# Patient Record
Sex: FEMALE | Race: WHITE | NOT HISPANIC OR LATINO | Employment: FULL TIME | URBAN - METROPOLITAN AREA
[De-identification: names, ages, dates, MRNs, and addresses within clinical notes are randomized per-mention and may not be internally consistent; named-entity substitution may affect disease eponyms.]

---

## 2018-01-18 PROCEDURE — 87255 GENET VIRUS ISOLATE HSV: CPT | Performed by: FAMILY MEDICINE

## 2018-01-22 ENCOUNTER — TELEPHONE (OUTPATIENT)
Dept: URGENT CARE | Facility: CLINIC | Age: 28
End: 2018-01-22

## 2018-01-22 NOTE — TELEPHONE ENCOUNTER
Notified Ms. Reyez of culture results, states she is feeling better, advised if symptoms return follow up with PCP, voiced understanding.

## 2018-08-23 ENCOUNTER — HOSPITAL ENCOUNTER (OUTPATIENT)
Facility: HOSPITAL | Age: 28
Discharge: HOME | End: 2018-08-23
Attending: OBSTETRICS & GYNECOLOGY | Admitting: OBSTETRICS & GYNECOLOGY

## 2018-08-23 VITALS
WEIGHT: 217 LBS | BODY MASS INDEX: 42.6 KG/M2 | SYSTOLIC BLOOD PRESSURE: 130 MMHG | TEMPERATURE: 98.4 F | RESPIRATION RATE: 18 BRPM | HEIGHT: 60 IN | HEART RATE: 92 BPM | DIASTOLIC BLOOD PRESSURE: 77 MMHG

## 2018-08-23 PROBLEM — O26.893 PREGNANCY HEADACHE, ANTEPARTUM, THIRD TRIMESTER: Status: ACTIVE | Noted: 2018-08-23

## 2018-08-23 PROBLEM — R51.9 PREGNANCY HEADACHE, ANTEPARTUM, THIRD TRIMESTER: Status: ACTIVE | Noted: 2018-08-23

## 2018-08-23 LAB
ALP SERPL-CCNC: 100 U/L (ref 25–100)
ALT SERPL W P-5'-P-CCNC: 24 U/L (ref 7–40)
AST SERPL-CCNC: 25 U/L (ref 0–33)
BACTERIA UR QL AUTO: ABNORMAL /HPF
BILIRUB SERPL-MCNC: 0.3 MG/DL (ref 0.3–1.2)
BILIRUB UR QL STRIP: NEGATIVE
CLARITY UR: ABNORMAL
COD CRY URNS QL: ABNORMAL /HPF
COLOR UR: YELLOW
CREAT BLD-MCNC: 0.65 MG/DL (ref 0.6–1.3)
DEPRECATED RDW RBC AUTO: 41.1 FL (ref 37–54)
ERYTHROCYTE [DISTWIDTH] IN BLOOD BY AUTOMATED COUNT: 13.7 % (ref 11.3–14.5)
GLUCOSE UR STRIP-MCNC: NEGATIVE MG/DL
HCT VFR BLD AUTO: 36.9 % (ref 34.5–44)
HGB BLD-MCNC: 11.9 G/DL (ref 11.5–15.5)
HGB UR QL STRIP.AUTO: NEGATIVE
HYALINE CASTS UR QL AUTO: ABNORMAL /LPF
KETONES UR QL STRIP: NEGATIVE
LDH SERPL-CCNC: 172 U/L (ref 120–246)
LEUKOCYTE ESTERASE UR QL STRIP.AUTO: NEGATIVE
MCH RBC QN AUTO: 26.5 PG (ref 27–31)
MCHC RBC AUTO-ENTMCNC: 32.2 G/DL (ref 32–36)
MCV RBC AUTO: 82.2 FL (ref 80–99)
NITRITE UR QL STRIP: NEGATIVE
PH UR STRIP.AUTO: 6 [PH] (ref 5–8)
PLATELET # BLD AUTO: 264 10*3/MM3 (ref 150–450)
PMV BLD AUTO: 11.3 FL (ref 6–12)
PROT UR QL STRIP: ABNORMAL
RBC # BLD AUTO: 4.49 10*6/MM3 (ref 3.89–5.14)
RBC # UR: ABNORMAL /HPF
REF LAB TEST METHOD: ABNORMAL
SP GR UR STRIP: 1.02 (ref 1–1.03)
SQUAMOUS #/AREA URNS HPF: ABNORMAL /HPF
URATE SERPL-MCNC: 5.2 MG/DL (ref 3.1–7.8)
UROBILINOGEN UR QL STRIP: ABNORMAL
WBC NRBC COR # BLD: 9.48 10*3/MM3 (ref 3.5–10.8)
WBC UR QL AUTO: ABNORMAL /HPF

## 2018-08-23 PROCEDURE — 59025 FETAL NON-STRESS TEST: CPT

## 2018-08-23 PROCEDURE — 99218 PR INITIAL OBSERVATION CARE/DAY 30 MINUTES: CPT | Performed by: OBSTETRICS & GYNECOLOGY

## 2018-08-23 PROCEDURE — G0378 HOSPITAL OBSERVATION PER HR: HCPCS

## 2018-08-23 PROCEDURE — G0463 HOSPITAL OUTPT CLINIC VISIT: HCPCS

## 2018-08-23 PROCEDURE — 84460 ALANINE AMINO (ALT) (SGPT): CPT | Performed by: OBSTETRICS & GYNECOLOGY

## 2018-08-23 PROCEDURE — 82565 ASSAY OF CREATININE: CPT | Performed by: OBSTETRICS & GYNECOLOGY

## 2018-08-23 PROCEDURE — 59025 FETAL NON-STRESS TEST: CPT | Performed by: OBSTETRICS & GYNECOLOGY

## 2018-08-23 PROCEDURE — 85027 COMPLETE CBC AUTOMATED: CPT | Performed by: OBSTETRICS & GYNECOLOGY

## 2018-08-23 PROCEDURE — 81001 URINALYSIS AUTO W/SCOPE: CPT | Performed by: OBSTETRICS & GYNECOLOGY

## 2018-08-23 PROCEDURE — 84550 ASSAY OF BLOOD/URIC ACID: CPT | Performed by: OBSTETRICS & GYNECOLOGY

## 2018-08-23 PROCEDURE — 83615 LACTATE (LD) (LDH) ENZYME: CPT | Performed by: OBSTETRICS & GYNECOLOGY

## 2018-08-23 PROCEDURE — 87086 URINE CULTURE/COLONY COUNT: CPT | Performed by: OBSTETRICS & GYNECOLOGY

## 2018-08-23 PROCEDURE — 84075 ASSAY ALKALINE PHOSPHATASE: CPT | Performed by: OBSTETRICS & GYNECOLOGY

## 2018-08-23 PROCEDURE — 82247 BILIRUBIN TOTAL: CPT | Performed by: OBSTETRICS & GYNECOLOGY

## 2018-08-23 PROCEDURE — 84450 TRANSFERASE (AST) (SGOT): CPT | Performed by: OBSTETRICS & GYNECOLOGY

## 2018-08-23 RX ORDER — RANITIDINE 150 MG/1
150 TABLET ORAL DAILY
COMMUNITY
End: 2019-05-13

## 2018-08-23 RX ORDER — PRENATAL WITH FERROUS FUM AND FOLIC ACID 3080; 920; 120; 400; 22; 1.84; 3; 20; 10; 1; 12; 200; 27; 25; 2 [IU]/1; [IU]/1; MG/1; [IU]/1; MG/1; MG/1; MG/1; MG/1; MG/1; MG/1; UG/1; MG/1; MG/1; MG/1; MG/1
1 TABLET ORAL DAILY
COMMUNITY
End: 2019-05-13

## 2018-08-23 RX ORDER — ACETAMINOPHEN 500 MG
1000 TABLET ORAL ONCE
Status: COMPLETED | OUTPATIENT
Start: 2018-08-23 | End: 2018-08-23

## 2018-08-23 RX ADMIN — ACETAMINOPHEN 1000 MG: 500 TABLET, FILM COATED ORAL at 20:46

## 2018-08-24 NOTE — H&P
"Bibi Michaelland  1990  [id number]  [id number]    CC: feet swelling  HPI:  Patient is 27 y.o. white female   currently at 31w3d  Presents with c/o swelling in both feet.  Pt returned from 4 day trip at [employer] on Monday.  Since then pt has had bilat pedal edema, worse at night.  Pt also with c/o frontal HA the past 2 nights, rates 4/10, occurred in evenings only, non-radiating, denies assoc V.  Some N (pt feels prob assoc with worsening GERD).  Good FM.  PNC comp by prev , hx preeclampsia last preg, obesity    PMH:  Current meds PNV, zantac 150mg qd  Illnesses none  Surgeries , lap emma, T and A, oral surg  Allergies NKDA    Past OB History:       Obstetric History       T0      L1     SAB0   TAB0   Ectopic0   Molar0   Multiple0   Live Births1       # Outcome Date GA Lbr Kimo/2nd Weight Sex Delivery Anes PTL Lv   2 Current            1  14 36w6d  2495 g (5 lb 8 oz) F CS-LTranv   VIRIDIANA      Complications: Failure to Progress in First Stage,Pre-eclampsia               SH: tob neg , EtOH neg, drugs neg  FH: heart dz pos , diabetes pos , cancer pos    General ROS: HA, N, edema.   All other systems reviewed and are negative.      Physical Examination: General appearance - alert, well appearing, and in no distress  Vital signs - /71   Pulse 93   Temp 98.4 °F (36.9 °C) (Oral)   Resp 18   Ht 152.4 cm (60\")   Wt 98.4 kg (217 lb)   LMP 01/15/2018 (Exact Date)   BMI 42.38 kg/m²   HEENT: normocephalic, atraumatic,oropharynx clear, appearance of ears and nose normal  Neck - supple, no significant adenopathy, no thyromegaly  Lymphatics - no palpable lymphadenopathy in the neck or groin, no hepatosplenomegaly  Chest - clear to auscultation, no wheezes, rales or rhonchi, respiratory effort non-labored  Heart - normal rate, regular rhythm, normal S1, S2, no murmurs, rubs, clicks or gallops, no JVD, 1+ lower extremity edema  Abdomen - soft, nontender, " nondistended, no masses, no hepatosplenomegaly  no rebound tenderness noted, bowel sounds normal  Vaginal Exam: deferred  etremities - 1+ pitting pedal edema noted, no calf tend  Skin -warm and dry, normal coloration and turgor, no rashes, no suspicious skin lesions noted        Fetal monitoring: indication swelling, headache , onset  , offset  , baseline 155 , mod BTB variability , multiple accels (15 X 15), no decels, rare contractions, interpretation reactive NST    Radiology     Assessment 1)IUP 31 3/7 weeks     2)headache- prob benign     3)pedal edema- all BP's normal, PEP normal    4)prev     5)hx preeclampsia     6)obesity    Plan 1)observe     2)home      3)keep next sched appt    4)Tyl for SINHA    Aniket Pulido MD  2018  8:36 PM

## 2018-08-24 NOTE — NURSING NOTE
REVIEWED DISCHARGE INSTRUCTIONS WITH PT. PT VU. PT INSTRUCTED TO KEEP REGULAR APPOINTMENT AND TO CALL OFFICE OR COME BACK TO HOSPITAL IF LEAKING, BLEEDING, HEADACHE WORSENS, FETUS NOT MOVING. PT VU.   PT ABMULATES OUT.

## 2018-08-25 LAB — BACTERIA SPEC AEROBE CULT: NORMAL

## 2018-08-29 ENCOUNTER — TRANSCRIBE ORDERS (OUTPATIENT)
Dept: DIABETES SERVICES | Facility: HOSPITAL | Age: 28
End: 2018-08-29

## 2018-08-29 DIAGNOSIS — O24.410 DIET CONTROLLED GESTATIONAL DIABETES MELLITUS (GDM), ANTEPARTUM: Primary | ICD-10-CM

## 2018-08-30 ENCOUNTER — HOSPITAL ENCOUNTER (OUTPATIENT)
Dept: DIABETES SERVICES | Facility: HOSPITAL | Age: 28
Setting detail: RECURRING SERIES
Discharge: HOME | End: 2018-08-30

## 2018-08-30 PROCEDURE — G0109 DIAB MANAGE TRN IND/GROUP: HCPCS | Performed by: DIETITIAN, REGISTERED

## 2018-09-11 ENCOUNTER — HOSPITAL ENCOUNTER (OUTPATIENT)
Dept: DIABETES SERVICES | Facility: HOSPITAL | Age: 28
Setting detail: RECURRING SERIES
Discharge: HOME | End: 2018-09-11

## 2018-09-21 ENCOUNTER — LAB (OUTPATIENT)
Dept: LAB | Facility: HOSPITAL | Age: 28
End: 2018-09-21

## 2018-09-21 ENCOUNTER — HOSPITAL ENCOUNTER (INPATIENT)
Facility: HOSPITAL | Age: 28
LOS: 6 days | Discharge: HOME | End: 2018-09-27
Attending: OBSTETRICS & GYNECOLOGY | Admitting: OBSTETRICS & GYNECOLOGY

## 2018-09-21 ENCOUNTER — LAB REQUISITION (OUTPATIENT)
Dept: LAB | Facility: HOSPITAL | Age: 28
End: 2018-09-21

## 2018-09-21 ENCOUNTER — TRANSCRIBE ORDERS (OUTPATIENT)
Dept: LAB | Facility: HOSPITAL | Age: 28
End: 2018-09-21

## 2018-09-21 DIAGNOSIS — Z34.83 PRENATAL CARE, SUBSEQUENT PREGNANCY, THIRD TRIMESTER: ICD-10-CM

## 2018-09-21 DIAGNOSIS — Z00.00 ROUTINE GENERAL MEDICAL EXAMINATION AT A HEALTH CARE FACILITY: ICD-10-CM

## 2018-09-21 DIAGNOSIS — Z34.83 PRENATAL CARE, SUBSEQUENT PREGNANCY, THIRD TRIMESTER: Primary | ICD-10-CM

## 2018-09-21 PROBLEM — O16.3 HYPERTENSION AFFECTING PREGNANCY IN THIRD TRIMESTER: Status: ACTIVE | Noted: 2018-09-21

## 2018-09-21 LAB
ALP SERPL-CCNC: 158 U/L (ref 25–100)
ALP SERPL-CCNC: 164 U/L (ref 25–100)
ALT SERPL W P-5'-P-CCNC: 15 U/L (ref 7–40)
ALT SERPL W P-5'-P-CCNC: 20 U/L (ref 7–40)
AST SERPL-CCNC: 17 U/L (ref 0–33)
AST SERPL-CCNC: 18 U/L (ref 0–33)
BASOPHILS # BLD AUTO: 0.01 10*3/MM3 (ref 0–0.2)
BASOPHILS NFR BLD AUTO: 0.1 % (ref 0–1)
BILIRUB SERPL-MCNC: 0.3 MG/DL (ref 0.3–1.2)
BILIRUB SERPL-MCNC: 0.3 MG/DL (ref 0.3–1.2)
CREAT BLD-MCNC: 0.7 MG/DL (ref 0.6–1.3)
CREAT BLD-MCNC: 0.82 MG/DL (ref 0.6–1.3)
CREAT UR-MCNC: 289.6 MG/DL
DEPRECATED RDW RBC AUTO: 38.3 FL (ref 37–54)
DEPRECATED RDW RBC AUTO: 39.6 FL (ref 37–54)
EOSINOPHIL # BLD AUTO: 0.05 10*3/MM3 (ref 0–0.3)
EOSINOPHIL NFR BLD AUTO: 0.5 % (ref 0–3)
ERYTHROCYTE [DISTWIDTH] IN BLOOD BY AUTOMATED COUNT: 13.5 % (ref 11.3–14.5)
ERYTHROCYTE [DISTWIDTH] IN BLOOD BY AUTOMATED COUNT: 13.7 % (ref 11.3–14.5)
GFR SERPL CREATININE-BSD FRML MDRD: 101 ML/MIN/1.73
HCT VFR BLD AUTO: 36.6 % (ref 34.5–44)
HCT VFR BLD AUTO: 37.7 % (ref 34.5–44)
HGB BLD-MCNC: 11.6 G/DL (ref 11.5–15.5)
HGB BLD-MCNC: 12.2 G/DL (ref 11.5–15.5)
IMM GRANULOCYTES # BLD: 0.02 10*3/MM3 (ref 0–0.03)
IMM GRANULOCYTES NFR BLD: 0.2 % (ref 0–0.6)
LDH SERPL-CCNC: 189 U/L (ref 120–246)
LDH SERPL-CCNC: 197 U/L (ref 120–246)
LYMPHOCYTES # BLD AUTO: 1.87 10*3/MM3 (ref 0.6–4.8)
LYMPHOCYTES NFR BLD AUTO: 18.5 % (ref 24–44)
MCH RBC QN AUTO: 25.3 PG (ref 27–31)
MCH RBC QN AUTO: 25.3 PG (ref 27–31)
MCHC RBC AUTO-ENTMCNC: 31.7 G/DL (ref 32–36)
MCHC RBC AUTO-ENTMCNC: 32.4 G/DL (ref 32–36)
MCV RBC AUTO: 78.2 FL (ref 80–99)
MCV RBC AUTO: 79.9 FL (ref 80–99)
MONOCYTES # BLD AUTO: 0.8 10*3/MM3 (ref 0–1)
MONOCYTES NFR BLD AUTO: 7.9 % (ref 0–12)
NEUTROPHILS # BLD AUTO: 7.37 10*3/MM3 (ref 1.5–8.3)
NEUTROPHILS NFR BLD AUTO: 73 % (ref 41–71)
PLATELET # BLD AUTO: 248 10*3/MM3 (ref 150–450)
PLATELET # BLD AUTO: 259 10*3/MM3 (ref 150–450)
PMV BLD AUTO: 12.8 FL (ref 6–12)
PMV BLD AUTO: 13.1 FL (ref 6–12)
PROT UR-MCNC: 116 MG/DL (ref 1–14)
RBC # BLD AUTO: 4.58 10*6/MM3 (ref 3.89–5.14)
RBC # BLD AUTO: 4.82 10*6/MM3 (ref 3.89–5.14)
URATE SERPL-MCNC: 6.8 MG/DL (ref 3.1–7.8)
URATE SERPL-MCNC: 7 MG/DL (ref 3.1–7.8)
WBC NRBC COR # BLD: 10.1 10*3/MM3 (ref 3.5–10.8)
WBC NRBC COR # BLD: 9.81 10*3/MM3 (ref 3.5–10.8)

## 2018-09-21 PROCEDURE — 86870 RBC ANTIBODY IDENTIFICATION: CPT | Performed by: OBSTETRICS & GYNECOLOGY

## 2018-09-21 PROCEDURE — 85027 COMPLETE CBC AUTOMATED: CPT | Performed by: OBSTETRICS & GYNECOLOGY

## 2018-09-21 PROCEDURE — 85025 COMPLETE CBC W/AUTO DIFF WBC: CPT | Performed by: OBSTETRICS & GYNECOLOGY

## 2018-09-21 PROCEDURE — 59025 FETAL NON-STRESS TEST: CPT

## 2018-09-21 PROCEDURE — 84550 ASSAY OF BLOOD/URIC ACID: CPT | Performed by: OBSTETRICS & GYNECOLOGY

## 2018-09-21 PROCEDURE — 83615 LACTATE (LD) (LDH) ENZYME: CPT | Performed by: OBSTETRICS & GYNECOLOGY

## 2018-09-21 PROCEDURE — 82570 ASSAY OF URINE CREATININE: CPT | Performed by: OBSTETRICS & GYNECOLOGY

## 2018-09-21 PROCEDURE — 84156 ASSAY OF PROTEIN URINE: CPT | Performed by: OBSTETRICS & GYNECOLOGY

## 2018-09-21 PROCEDURE — 84450 TRANSFERASE (AST) (SGOT): CPT | Performed by: OBSTETRICS & GYNECOLOGY

## 2018-09-21 PROCEDURE — 86900 BLOOD TYPING SEROLOGIC ABO: CPT | Performed by: OBSTETRICS & GYNECOLOGY

## 2018-09-21 PROCEDURE — 84075 ASSAY ALKALINE PHOSPHATASE: CPT | Performed by: OBSTETRICS & GYNECOLOGY

## 2018-09-21 PROCEDURE — 82247 BILIRUBIN TOTAL: CPT | Performed by: OBSTETRICS & GYNECOLOGY

## 2018-09-21 PROCEDURE — 82565 ASSAY OF CREATININE: CPT | Performed by: OBSTETRICS & GYNECOLOGY

## 2018-09-21 PROCEDURE — 86901 BLOOD TYPING SEROLOGIC RH(D): CPT | Performed by: OBSTETRICS & GYNECOLOGY

## 2018-09-21 PROCEDURE — 84460 ALANINE AMINO (ALT) (SGPT): CPT | Performed by: OBSTETRICS & GYNECOLOGY

## 2018-09-21 PROCEDURE — 82627 DEHYDROEPIANDROSTERONE: CPT | Performed by: OBSTETRICS & GYNECOLOGY

## 2018-09-21 PROCEDURE — 87081 CULTURE SCREEN ONLY: CPT

## 2018-09-21 PROCEDURE — 86850 RBC ANTIBODY SCREEN: CPT | Performed by: OBSTETRICS & GYNECOLOGY

## 2018-09-21 RX ORDER — SODIUM CHLORIDE 0.9 % (FLUSH) 0.9 %
1-10 SYRINGE (ML) INJECTION AS NEEDED
Status: DISCONTINUED | OUTPATIENT
Start: 2018-09-21 | End: 2018-09-27 | Stop reason: HOSPADM

## 2018-09-21 RX ORDER — LIDOCAINE HYDROCHLORIDE 10 MG/ML
5 INJECTION, SOLUTION EPIDURAL; INFILTRATION; INTRACAUDAL; PERINEURAL AS NEEDED
Status: DISCONTINUED | OUTPATIENT
Start: 2018-09-21 | End: 2018-09-27 | Stop reason: HOSPADM

## 2018-09-21 RX ORDER — ACETAMINOPHEN 500 MG
1000 TABLET ORAL EVERY 4 HOURS PRN
Status: DISCONTINUED | OUTPATIENT
Start: 2018-09-21 | End: 2018-09-27 | Stop reason: HOSPADM

## 2018-09-21 RX ADMIN — ACETAMINOPHEN 1000 MG: 500 TABLET, FILM COATED ORAL at 23:18

## 2018-09-22 LAB
ABO GROUP BLD: NORMAL
ANTI-D, PASSIVE: NORMAL
BLD GP AB SCN SERPL QL: POSITIVE
DHEA-S SERPL-MCNC: 190.6 UG/DL (ref 84.8–378)
GLUCOSE BLDC GLUCOMTR-MCNC: 73 MG/DL (ref 70–130)
RH BLD: NEGATIVE
T&S EXPIRATION DATE: NORMAL

## 2018-09-22 PROCEDURE — 82962 GLUCOSE BLOOD TEST: CPT

## 2018-09-22 PROCEDURE — 59025 FETAL NON-STRESS TEST: CPT

## 2018-09-22 RX ORDER — FAMOTIDINE 20 MG/1
20 TABLET, FILM COATED ORAL 2 TIMES DAILY
Status: DISCONTINUED | OUTPATIENT
Start: 2018-09-22 | End: 2018-09-22

## 2018-09-22 RX ORDER — TRISODIUM CITRATE DIHYDRATE AND CITRIC ACID MONOHYDRATE 500; 334 MG/5ML; MG/5ML
30 SOLUTION ORAL ONCE
Status: COMPLETED | OUTPATIENT
Start: 2018-09-22 | End: 2018-09-23

## 2018-09-22 RX ORDER — FAMOTIDINE 20 MG/1
20 TABLET, FILM COATED ORAL 2 TIMES DAILY
Status: DISCONTINUED | OUTPATIENT
Start: 2018-09-22 | End: 2018-09-27 | Stop reason: HOSPADM

## 2018-09-22 RX ADMIN — FAMOTIDINE 20 MG: 20 TABLET, FILM COATED ORAL at 20:13

## 2018-09-22 RX ADMIN — ACETAMINOPHEN 1000 MG: 500 TABLET, FILM COATED ORAL at 21:36

## 2018-09-23 ENCOUNTER — ANESTHESIA (OUTPATIENT)
Dept: LABOR AND DELIVERY | Facility: HOSPITAL | Age: 28
End: 2018-09-23

## 2018-09-23 ENCOUNTER — ANESTHESIA EVENT (OUTPATIENT)
Dept: LABOR AND DELIVERY | Facility: HOSPITAL | Age: 28
End: 2018-09-23

## 2018-09-23 LAB
ALP SERPL-CCNC: 157 U/L (ref 25–100)
ALT SERPL W P-5'-P-CCNC: 13 U/L (ref 7–40)
AST SERPL-CCNC: 16 U/L (ref 0–33)
ATMOSPHERIC PRESS: ABNORMAL MMHG
ATMOSPHERIC PRESS: ABNORMAL MMHG
BACTERIA UR QL AUTO: ABNORMAL /HPF
BASE EXCESS BLDCOA CALC-SCNC: -3.8 MMOL/L (ref 0–2)
BASE EXCESS BLDCOV CALC-SCNC: -4 MMOL/L (ref 0–2)
BDY SITE: ABNORMAL
BILIRUB SERPL-MCNC: 0.3 MG/DL (ref 0.3–1.2)
BILIRUB UR QL STRIP: NEGATIVE
CLARITY UR: ABNORMAL
CO2 BLDA-SCNC: 23.2 MMOL/L (ref 22–33)
CO2 BLDA-SCNC: 24.2 MMOL/L (ref 22–33)
COD CRY URNS QL: ABNORMAL /HPF
COLLECT DURATION TIME UR: 24 HRS
COLLECT DURATION TIME UR: 24 HRS
COLOR UR: ABNORMAL
CREAT BLD-MCNC: 0.75 MG/DL (ref 0.6–1.3)
CREAT UR-MCNC: 343 MG/DL
CREATINE 24H UR-MRATE: 1.37 G/24 HR (ref 0.6–1.8)
DEPRECATED RDW RBC AUTO: 39.7 FL (ref 37–54)
ERYTHROCYTE [DISTWIDTH] IN BLOOD BY AUTOMATED COUNT: 13.8 % (ref 11.3–14.5)
GLUCOSE UR STRIP-MCNC: NEGATIVE MG/DL
HCO3 BLDCOA-SCNC: 22.7 MMOL/L (ref 16.9–20.5)
HCO3 BLDCOV-SCNC: 21.9 MMOL/L (ref 18.6–21.4)
HCT VFR BLD AUTO: 36.6 % (ref 34.5–44)
HGB BLD-MCNC: 11.4 G/DL (ref 11.5–15.5)
HGB BLDA-MCNC: 13.8 G/DL (ref 14–18)
HGB BLDA-MCNC: 14.5 G/DL (ref 14–18)
HGB UR QL STRIP.AUTO: NEGATIVE
HOROWITZ INDEX BLD+IHG-RTO: 21 %
HOROWITZ INDEX BLD+IHG-RTO: 21 %
HYALINE CASTS UR QL AUTO: ABNORMAL /LPF
KETONES UR QL STRIP: NEGATIVE
LDH SERPL-CCNC: 183 U/L (ref 120–246)
LEUKOCYTE ESTERASE UR QL STRIP.AUTO: NEGATIVE
MCH RBC QN AUTO: 24.8 PG (ref 27–31)
MCHC RBC AUTO-ENTMCNC: 31.1 G/DL (ref 32–36)
MCV RBC AUTO: 79.6 FL (ref 80–99)
MODALITY: ABNORMAL
MODALITY: ABNORMAL
MUCOUS THREADS URNS QL MICRO: ABNORMAL /HPF
NITRITE UR QL STRIP: NEGATIVE
PCO2 BLDCOA: 45.8 MMHG (ref 43.3–54.9)
PCO2 BLDCOV: 41.9 MM HG (ref 30–60)
PH BLDCOA: 7.3 PH UNITS (ref 7.2–7.3)
PH BLDCOV: 7.33 PH UNITS (ref 7.19–7.46)
PH UR STRIP.AUTO: 5.5 [PH] (ref 5–8)
PLATELET # BLD AUTO: 246 10*3/MM3 (ref 150–450)
PMV BLD AUTO: 12.6 FL (ref 6–12)
PO2 BLDCOA: 14.1 MMHG (ref 30–43.3)
PO2 BLDCOV: 15.9 MM HG (ref 30–60)
PROT 24H UR-MRATE: 336 MG/24HOURS (ref 42–225)
PROT UR QL STRIP: ABNORMAL
PROT UR-MCNC: 84 MG/DL (ref 1–14)
RBC # BLD AUTO: 4.6 10*6/MM3 (ref 3.89–5.14)
RBC # UR: ABNORMAL /HPF
REF LAB TEST METHOD: ABNORMAL
SAO2 % BLDCOA: 22.5 %
SAO2 % BLDCOA: ABNORMAL % (ref 45–75)
SAO2 % BLDCOV: 28.6 %
SP GR UR STRIP: 1.04 (ref 1–1.03)
SPECIMEN VOL 24H UR: 400 ML
SPECIMEN VOL 24H UR: 400 ML
SQUAMOUS #/AREA URNS HPF: ABNORMAL /HPF
URATE SERPL-MCNC: 7.3 MG/DL (ref 3.1–7.8)
UROBILINOGEN UR QL STRIP: ABNORMAL
WBC NRBC COR # BLD: 9.03 10*3/MM3 (ref 3.5–10.8)
WBC UR QL AUTO: ABNORMAL /HPF

## 2018-09-23 PROCEDURE — 84156 ASSAY OF PROTEIN URINE: CPT | Performed by: OBSTETRICS & GYNECOLOGY

## 2018-09-23 PROCEDURE — 25010000002 MAGNESIUM SULFATE-LACT RINGERS 40 GM/580ML SOLUTION: Performed by: OBSTETRICS & GYNECOLOGY

## 2018-09-23 PROCEDURE — 84460 ALANINE AMINO (ALT) (SGPT): CPT | Performed by: OBSTETRICS & GYNECOLOGY

## 2018-09-23 PROCEDURE — 25010000002 PROMETHAZINE PER 50 MG: Performed by: OBSTETRICS & GYNECOLOGY

## 2018-09-23 PROCEDURE — 25010000002 BETAMETHASONE ACET & SOD PHOS PER 4 MG: Performed by: OBSTETRICS & GYNECOLOGY

## 2018-09-23 PROCEDURE — 82565 ASSAY OF CREATININE: CPT | Performed by: OBSTETRICS & GYNECOLOGY

## 2018-09-23 PROCEDURE — 83615 LACTATE (LD) (LDH) ENZYME: CPT | Performed by: OBSTETRICS & GYNECOLOGY

## 2018-09-23 PROCEDURE — 84075 ASSAY ALKALINE PHOSPHATASE: CPT | Performed by: OBSTETRICS & GYNECOLOGY

## 2018-09-23 PROCEDURE — 51703 INSERT BLADDER CATH COMPLEX: CPT

## 2018-09-23 PROCEDURE — 25010000002 ONDANSETRON PER 1 MG: Performed by: ANESTHESIOLOGY

## 2018-09-23 PROCEDURE — 81001 URINALYSIS AUTO W/SCOPE: CPT | Performed by: OBSTETRICS & GYNECOLOGY

## 2018-09-23 PROCEDURE — 84550 ASSAY OF BLOOD/URIC ACID: CPT | Performed by: OBSTETRICS & GYNECOLOGY

## 2018-09-23 PROCEDURE — 82570 ASSAY OF URINE CREATININE: CPT | Performed by: OBSTETRICS & GYNECOLOGY

## 2018-09-23 PROCEDURE — 84450 TRANSFERASE (AST) (SGOT): CPT | Performed by: OBSTETRICS & GYNECOLOGY

## 2018-09-23 PROCEDURE — 25010000002 METOCLOPRAMIDE PER 10 MG: Performed by: ANESTHESIOLOGY

## 2018-09-23 PROCEDURE — 25010000003 CEFAZOLIN IN DEXTROSE 2-4 GM/100ML-% SOLUTION: Performed by: OBSTETRICS & GYNECOLOGY

## 2018-09-23 PROCEDURE — 59514 CESAREAN DELIVERY ONLY: CPT | Performed by: OBSTETRICS & GYNECOLOGY

## 2018-09-23 PROCEDURE — 25010000002 FENTANYL CITRATE (PF) 100 MCG/2ML SOLUTION: Performed by: ANESTHESIOLOGY

## 2018-09-23 PROCEDURE — 25010000003 MORPHINE PER 10 MG: Performed by: ANESTHESIOLOGY

## 2018-09-23 PROCEDURE — 85027 COMPLETE CBC AUTOMATED: CPT | Performed by: OBSTETRICS & GYNECOLOGY

## 2018-09-23 PROCEDURE — 81050 URINALYSIS VOLUME MEASURE: CPT | Performed by: OBSTETRICS & GYNECOLOGY

## 2018-09-23 PROCEDURE — 25010000002 MIDAZOLAM PER 1 MG: Performed by: ANESTHESIOLOGY

## 2018-09-23 PROCEDURE — 82805 BLOOD GASES W/O2 SATURATION: CPT | Performed by: OBSTETRICS & GYNECOLOGY

## 2018-09-23 PROCEDURE — 87086 URINE CULTURE/COLONY COUNT: CPT | Performed by: OBSTETRICS & GYNECOLOGY

## 2018-09-23 PROCEDURE — 59025 FETAL NON-STRESS TEST: CPT

## 2018-09-23 PROCEDURE — 82247 BILIRUBIN TOTAL: CPT | Performed by: OBSTETRICS & GYNECOLOGY

## 2018-09-23 RX ORDER — BUTALBITAL, ACETAMINOPHEN AND CAFFEINE 50; 325; 40 MG/1; MG/1; MG/1
2 TABLET ORAL EVERY 4 HOURS PRN
Status: DISCONTINUED | OUTPATIENT
Start: 2018-09-23 | End: 2018-09-27 | Stop reason: HOSPADM

## 2018-09-23 RX ORDER — SODIUM CHLORIDE, SODIUM LACTATE, POTASSIUM CHLORIDE, CALCIUM CHLORIDE 600; 310; 30; 20 MG/100ML; MG/100ML; MG/100ML; MG/100ML
125 INJECTION, SOLUTION INTRAVENOUS CONTINUOUS
Status: DISCONTINUED | OUTPATIENT
Start: 2018-09-23 | End: 2018-09-24

## 2018-09-23 RX ORDER — MIDAZOLAM HYDROCHLORIDE 1 MG/ML
INJECTION INTRAMUSCULAR; INTRAVENOUS AS NEEDED
Status: DISCONTINUED | OUTPATIENT
Start: 2018-09-23 | End: 2018-09-23 | Stop reason: SURG

## 2018-09-23 RX ORDER — MAGNESIUM SULF/RINGERS LACTATE 40 G/500ML
2 PLASTIC BAG, INJECTION (ML) INTRAVENOUS CONTINUOUS
Status: DISPENSED | OUTPATIENT
Start: 2018-09-23 | End: 2018-09-24

## 2018-09-23 RX ORDER — BUPIVACAINE HYDROCHLORIDE 5 MG/ML
INJECTION, SOLUTION EPIDURAL; INTRACAUDAL AS NEEDED
Status: DISCONTINUED | OUTPATIENT
Start: 2018-09-23 | End: 2018-09-23 | Stop reason: SURG

## 2018-09-23 RX ORDER — FAMOTIDINE 10 MG/ML
INJECTION, SOLUTION INTRAVENOUS AS NEEDED
Status: DISCONTINUED | OUTPATIENT
Start: 2018-09-23 | End: 2018-09-23 | Stop reason: SURG

## 2018-09-23 RX ORDER — MORPHINE SULFATE 0.5 MG/ML
INJECTION, SOLUTION EPIDURAL; INTRATHECAL; INTRAVENOUS AS NEEDED
Status: DISCONTINUED | OUTPATIENT
Start: 2018-09-23 | End: 2018-09-23 | Stop reason: SURG

## 2018-09-23 RX ORDER — BETAMETHASONE SODIUM PHOSPHATE AND BETAMETHASONE ACETATE 3; 3 MG/ML; MG/ML
12 INJECTION, SUSPENSION INTRA-ARTICULAR; INTRALESIONAL; INTRAMUSCULAR; SOFT TISSUE EVERY 24 HOURS
Status: DISCONTINUED | OUTPATIENT
Start: 2018-09-23 | End: 2018-09-24

## 2018-09-23 RX ORDER — METOCLOPRAMIDE HYDROCHLORIDE 5 MG/ML
INJECTION INTRAMUSCULAR; INTRAVENOUS AS NEEDED
Status: DISCONTINUED | OUTPATIENT
Start: 2018-09-23 | End: 2018-09-23 | Stop reason: SURG

## 2018-09-23 RX ORDER — CEFAZOLIN SODIUM 2 G/100ML
2 INJECTION, SOLUTION INTRAVENOUS ONCE
Status: COMPLETED | OUTPATIENT
Start: 2018-09-23 | End: 2018-09-23

## 2018-09-23 RX ORDER — OXYCODONE HYDROCHLORIDE AND ACETAMINOPHEN 5; 325 MG/1; MG/1
1 TABLET ORAL EVERY 4 HOURS PRN
Status: DISCONTINUED | OUTPATIENT
Start: 2018-09-23 | End: 2018-09-27 | Stop reason: HOSPADM

## 2018-09-23 RX ORDER — PROMETHAZINE HYDROCHLORIDE 25 MG/ML
12.5 INJECTION, SOLUTION INTRAMUSCULAR; INTRAVENOUS EVERY 6 HOURS PRN
Status: DISCONTINUED | OUTPATIENT
Start: 2018-09-23 | End: 2018-09-27 | Stop reason: HOSPADM

## 2018-09-23 RX ORDER — OXYTOCIN 10 [USP'U]/ML
INJECTION, SOLUTION INTRAMUSCULAR; INTRAVENOUS AS NEEDED
Status: DISCONTINUED | OUTPATIENT
Start: 2018-09-23 | End: 2018-09-23 | Stop reason: SURG

## 2018-09-23 RX ORDER — ONDANSETRON 2 MG/ML
4 INJECTION INTRAMUSCULAR; INTRAVENOUS ONCE
Status: COMPLETED | OUTPATIENT
Start: 2018-09-23 | End: 2018-09-23

## 2018-09-23 RX ORDER — HYDROCODONE BITARTRATE AND ACETAMINOPHEN 5; 325 MG/1; MG/1
1 TABLET ORAL EVERY 4 HOURS PRN
Status: DISCONTINUED | OUTPATIENT
Start: 2018-09-23 | End: 2018-09-27 | Stop reason: HOSPADM

## 2018-09-23 RX ORDER — MAGNESIUM SULF/RINGERS LACTATE 40 G/500ML
PLASTIC BAG, INJECTION (ML) INTRAVENOUS
Status: DISPENSED
Start: 2018-09-23 | End: 2018-09-23

## 2018-09-23 RX ORDER — FENTANYL CITRATE 50 UG/ML
INJECTION, SOLUTION INTRAMUSCULAR; INTRAVENOUS AS NEEDED
Status: DISCONTINUED | OUTPATIENT
Start: 2018-09-23 | End: 2018-09-23 | Stop reason: SURG

## 2018-09-23 RX ORDER — DEXTROSE AND SODIUM CHLORIDE 5; .2 G/100ML; G/100ML
96 INJECTION, SOLUTION INTRAVENOUS CONTINUOUS
Status: DISCONTINUED | OUTPATIENT
Start: 2018-09-23 | End: 2018-09-27 | Stop reason: HOSPADM

## 2018-09-23 RX ADMIN — METOCLOPRAMIDE 10 MG: 5 INJECTION, SOLUTION INTRAMUSCULAR; INTRAVENOUS at 15:40

## 2018-09-23 RX ADMIN — FENTANYL CITRATE 15 MCG: 50 INJECTION, SOLUTION INTRAMUSCULAR; INTRAVENOUS at 16:15

## 2018-09-23 RX ADMIN — SODIUM CHLORIDE, POTASSIUM CHLORIDE, SODIUM LACTATE AND CALCIUM CHLORIDE: 600; 310; 30; 20 INJECTION, SOLUTION INTRAVENOUS at 16:58

## 2018-09-23 RX ADMIN — SODIUM CHLORIDE, POTASSIUM CHLORIDE, SODIUM LACTATE AND CALCIUM CHLORIDE: 600; 310; 30; 20 INJECTION, SOLUTION INTRAVENOUS at 16:01

## 2018-09-23 RX ADMIN — HYDROCODONE BITARTRATE AND ACETAMINOPHEN 1 TABLET: 5; 325 TABLET ORAL at 22:28

## 2018-09-23 RX ADMIN — SODIUM CHLORIDE, POTASSIUM CHLORIDE, SODIUM LACTATE AND CALCIUM CHLORIDE 3000 ML/HR: 600; 310; 30; 20 INJECTION, SOLUTION INTRAVENOUS at 15:30

## 2018-09-23 RX ADMIN — BUTALBITAL, ACETAMINOPHEN AND CAFFEINE 2 TABLET: 50; 325; 40 TABLET ORAL at 14:40

## 2018-09-23 RX ADMIN — ONDANSETRON 4 MG: 2 INJECTION INTRAMUSCULAR; INTRAVENOUS at 18:15

## 2018-09-23 RX ADMIN — CEFAZOLIN SODIUM 2 G: 2 INJECTION, SOLUTION INTRAVENOUS at 15:53

## 2018-09-23 RX ADMIN — DEXTROSE AND SODIUM CHLORIDE 96 ML/HR: 5; 200 INJECTION, SOLUTION INTRAVENOUS at 11:46

## 2018-09-23 RX ADMIN — DEXTROSE AND SODIUM CHLORIDE 96 ML/HR: 5; 200 INJECTION, SOLUTION INTRAVENOUS at 19:08

## 2018-09-23 RX ADMIN — SODIUM CHLORIDE, POTASSIUM CHLORIDE, SODIUM LACTATE AND CALCIUM CHLORIDE 125 ML/HR: 600; 310; 30; 20 INJECTION, SOLUTION INTRAVENOUS at 10:19

## 2018-09-23 RX ADMIN — ACETAMINOPHEN 1000 MG: 500 TABLET, FILM COATED ORAL at 12:47

## 2018-09-23 RX ADMIN — SODIUM CITRATE AND CITRIC ACID MONOHYDRATE 30 ML: 500; 334 SOLUTION ORAL at 15:51

## 2018-09-23 RX ADMIN — SODIUM CHLORIDE, POTASSIUM CHLORIDE, SODIUM LACTATE AND CALCIUM CHLORIDE 125 ML/HR: 600; 310; 30; 20 INJECTION, SOLUTION INTRAVENOUS at 15:51

## 2018-09-23 RX ADMIN — MIDAZOLAM HYDROCHLORIDE 1 MG: 1 INJECTION, SOLUTION INTRAMUSCULAR; INTRAVENOUS at 16:51

## 2018-09-23 RX ADMIN — OXYCODONE HYDROCHLORIDE AND ACETAMINOPHEN 1 TABLET: 5; 325 TABLET ORAL at 17:53

## 2018-09-23 RX ADMIN — FAMOTIDINE 20 MG: 20 TABLET, FILM COATED ORAL at 09:21

## 2018-09-23 RX ADMIN — MORPHINE SULFATE 0.1 MG: 0.5 INJECTION, SOLUTION EPIDURAL; INTRATHECAL; INTRAVENOUS at 16:15

## 2018-09-23 RX ADMIN — FAMOTIDINE 20 MG: 10 INJECTION, SOLUTION INTRAVENOUS at 15:38

## 2018-09-23 RX ADMIN — Medication 10 ML: at 09:22

## 2018-09-23 RX ADMIN — BETAMETHASONE SODIUM PHOSPHATE AND BETAMETHASONE ACETATE 12 MG: 3; 3 INJECTION, SUSPENSION INTRA-ARTICULAR; INTRALESIONAL; INTRAMUSCULAR at 10:22

## 2018-09-23 RX ADMIN — MIDAZOLAM HYDROCHLORIDE 1 MG: 1 INJECTION, SOLUTION INTRAMUSCULAR; INTRAVENOUS at 16:37

## 2018-09-23 RX ADMIN — OXYTOCIN 30 UNITS: 10 INJECTION, SOLUTION INTRAMUSCULAR; INTRAVENOUS at 16:57

## 2018-09-23 RX ADMIN — MIDAZOLAM HYDROCHLORIDE 1 MG: 1 INJECTION, SOLUTION INTRAMUSCULAR; INTRAVENOUS at 16:02

## 2018-09-23 RX ADMIN — BUPIVACAINE HYDROCHLORIDE 1.9 ML: 5 INJECTION, SOLUTION EPIDURAL; INTRACAUDAL; PERINEURAL at 16:15

## 2018-09-23 RX ADMIN — PROMETHAZINE HYDROCHLORIDE 12.5 MG: 25 INJECTION INTRAMUSCULAR; INTRAVENOUS at 19:54

## 2018-09-23 RX ADMIN — OXYTOCIN 30 UNITS: 10 INJECTION, SOLUTION INTRAMUSCULAR; INTRAVENOUS at 16:38

## 2018-09-23 RX ADMIN — Medication 2 G/HR: at 12:27

## 2018-09-23 NOTE — ANESTHESIA PREPROCEDURE EVALUATION
Anesthesia Evaluation     Patient summary reviewed and Nursing notes reviewed   NPO Solid Status: > 6 hours  NPO Liquid Status: > 6 hours           Airway   Mallampati: II  TM distance: <3 FB  Neck ROM: full  Possible difficult intubation  Dental      Pulmonary - negative pulmonary ROS   Cardiovascular     (+) hypertension poorly controlled,       Neuro/Psych  (+) headaches,     GI/Hepatic/Renal/Endo    (+) morbid obesity,      Musculoskeletal (-) negative ROS    Abdominal   (+) obese,    Substance History - negative use     OB/GYN    (+) Pregnant, Preeclampsia, pregnancy induced hypertension        Other - negative ROS                       Anesthesia Plan    ASA 3     spinal and ITN     Anesthetic plan, all risks, benefits, and alternatives have been provided, discussed and informed consent has been obtained with: patient and spouse/significant other.

## 2018-09-23 NOTE — ANESTHESIA PROCEDURE NOTES
Spinal Block      Patient location during procedure: OB  Indication:procedure for pain  Performed By  Anesthesiologist: MAULIK BONILLA  Preanesthetic Checklist  Completed: patient identified, surgical consent, pre-op evaluation, timeout performed, IV checked, risks and benefits discussed and monitors and equipment checked  Spinal Block Prep:  Patient Position:sitting  Sterile Tech:cap, gloves, mask and sterile barriers  Prep:Betadine  Patient Monitoring:blood pressure monitoring and EKG  Spinal Block Procedure  Approach:midline  Guidance:palpation technique  Location:L3-L4  Needle Type:Adithya  Needle Gauge:25 G  Placement of Spinal needle event:cerebrospinal fluid aspirated  Paresthesia: no  Fluid Appearance:clear  Post Assessment  Patient Tolerance:patient tolerated the procedure well with no apparent complications  Complications no

## 2018-09-23 NOTE — ANESTHESIA POSTPROCEDURE EVALUATION
Patient: Bibi Reyez    Procedure Summary     Date:  18 Room / Location:  [facility] LABOR DELIVERY   [facility] LABOR DELIVERY    Anesthesia Start:  1601 Anesthesia Stop:      Procedure:   SECTION REPEAT (N/A Abdomen) Diagnosis:      Surgeon:  Susan Scott MD Provider:  Kiah Garcia DO    Anesthesia Type:  spinal, ITN ASA Status:  3          Anesthesia Type: spinal, ITN  Last vitals  /77     Temp 97.9     Pulse 88     Resp   18     SpO2 99        Post Anesthesia Care and Evaluation    Patient location during evaluation: bedside  Patient participation: complete - patient participated  Level of consciousness: awake and awake and alert  Pain score: 0  Pain management: satisfactory to patient  Airway patency: patent  Anesthetic complications: No anesthetic complications  PONV Status: none  Cardiovascular status: acceptable  Respiratory status: acceptable  Hydration status: acceptable  Post Neuraxial Block status: No signs or symptoms of PDPH

## 2018-09-24 LAB
ABO GROUP BLD: NORMAL
ALP SERPL-CCNC: 141 U/L (ref 25–100)
ALT SERPL W P-5'-P-CCNC: 12 U/L (ref 7–40)
AST SERPL-CCNC: 17 U/L (ref 0–33)
BACTERIA SPEC AEROBE CULT: NORMAL
BILIRUB SERPL-MCNC: 0.2 MG/DL (ref 0.3–1.2)
CREAT BLD-MCNC: 0.75 MG/DL (ref 0.6–1.3)
DEPRECATED RDW RBC AUTO: 39.6 FL (ref 37–54)
ERYTHROCYTE [DISTWIDTH] IN BLOOD BY AUTOMATED COUNT: 13.9 % (ref 11.3–14.5)
FETAL BLEED: NEGATIVE
HCT VFR BLD AUTO: 30.8 % (ref 34.5–44)
HGB BLD-MCNC: 9.9 G/DL (ref 11.5–15.5)
LDH SERPL-CCNC: 258 U/L (ref 120–246)
MCH RBC QN AUTO: 25.3 PG (ref 27–31)
MCHC RBC AUTO-ENTMCNC: 32.1 G/DL (ref 32–36)
MCV RBC AUTO: 78.8 FL (ref 80–99)
PLATELET # BLD AUTO: 274 10*3/MM3 (ref 150–450)
PMV BLD AUTO: 12.9 FL (ref 6–12)
RBC # BLD AUTO: 3.91 10*6/MM3 (ref 3.89–5.14)
RH BLD: NEGATIVE
URATE SERPL-MCNC: 7.3 MG/DL (ref 3.1–7.8)
WBC NRBC COR # BLD: 16.66 10*3/MM3 (ref 3.5–10.8)

## 2018-09-24 PROCEDURE — 85027 COMPLETE CBC AUTOMATED: CPT | Performed by: OBSTETRICS & GYNECOLOGY

## 2018-09-24 PROCEDURE — 86900 BLOOD TYPING SEROLOGIC ABO: CPT | Performed by: OBSTETRICS & GYNECOLOGY

## 2018-09-24 PROCEDURE — 25010000002 MAGNESIUM SULFATE-LACT RINGERS 40 GM/580ML SOLUTION: Performed by: OBSTETRICS & GYNECOLOGY

## 2018-09-24 PROCEDURE — 83615 LACTATE (LD) (LDH) ENZYME: CPT | Performed by: OBSTETRICS & GYNECOLOGY

## 2018-09-24 PROCEDURE — 84550 ASSAY OF BLOOD/URIC ACID: CPT | Performed by: OBSTETRICS & GYNECOLOGY

## 2018-09-24 PROCEDURE — 82565 ASSAY OF CREATININE: CPT | Performed by: OBSTETRICS & GYNECOLOGY

## 2018-09-24 PROCEDURE — 25010000002 RHO D IMMUNE GLOBULIN 1500 UNIT/2ML SOLUTION PREFILLED SYRINGE: Performed by: OBSTETRICS & GYNECOLOGY

## 2018-09-24 PROCEDURE — 84075 ASSAY ALKALINE PHOSPHATASE: CPT | Performed by: OBSTETRICS & GYNECOLOGY

## 2018-09-24 PROCEDURE — 85461 HEMOGLOBIN FETAL: CPT | Performed by: OBSTETRICS & GYNECOLOGY

## 2018-09-24 PROCEDURE — 86901 BLOOD TYPING SEROLOGIC RH(D): CPT | Performed by: OBSTETRICS & GYNECOLOGY

## 2018-09-24 PROCEDURE — 82247 BILIRUBIN TOTAL: CPT | Performed by: OBSTETRICS & GYNECOLOGY

## 2018-09-24 PROCEDURE — 84450 TRANSFERASE (AST) (SGOT): CPT | Performed by: OBSTETRICS & GYNECOLOGY

## 2018-09-24 PROCEDURE — 84460 ALANINE AMINO (ALT) (SGPT): CPT | Performed by: OBSTETRICS & GYNECOLOGY

## 2018-09-24 RX ORDER — SODIUM CHLORIDE 0.9 % (FLUSH) 0.9 %
1-10 SYRINGE (ML) INJECTION AS NEEDED
Status: DISCONTINUED | OUTPATIENT
Start: 2018-09-24 | End: 2018-09-27 | Stop reason: HOSPADM

## 2018-09-24 RX ORDER — DOCUSATE SODIUM 100 MG/1
100 CAPSULE, LIQUID FILLED ORAL 2 TIMES DAILY PRN
Status: DISCONTINUED | OUTPATIENT
Start: 2018-09-24 | End: 2018-09-27 | Stop reason: HOSPADM

## 2018-09-24 RX ORDER — ONDANSETRON 4 MG/1
4 TABLET, FILM COATED ORAL EVERY 8 HOURS PRN
Status: DISCONTINUED | OUTPATIENT
Start: 2018-09-24 | End: 2018-09-27 | Stop reason: HOSPADM

## 2018-09-24 RX ORDER — SIMETHICONE 80 MG
80 TABLET,CHEWABLE ORAL 4 TIMES DAILY PRN
Status: DISCONTINUED | OUTPATIENT
Start: 2018-09-24 | End: 2018-09-27 | Stop reason: HOSPADM

## 2018-09-24 RX ORDER — LANOLIN 100 %
OINTMENT (GRAM) TOPICAL
Status: DISCONTINUED | OUTPATIENT
Start: 2018-09-24 | End: 2018-09-27 | Stop reason: HOSPADM

## 2018-09-24 RX ORDER — IBUPROFEN 600 MG/1
600 TABLET ORAL EVERY 6 HOURS PRN
Status: DISCONTINUED | OUTPATIENT
Start: 2018-09-24 | End: 2018-09-27 | Stop reason: HOSPADM

## 2018-09-24 RX ADMIN — HYDROCODONE BITARTRATE AND ACETAMINOPHEN 1 TABLET: 5; 325 TABLET ORAL at 14:07

## 2018-09-24 RX ADMIN — SIMETHICONE CHEW TAB 80 MG 80 MG: 80 TABLET ORAL at 18:22

## 2018-09-24 RX ADMIN — Medication: at 18:22

## 2018-09-24 RX ADMIN — HYDROCODONE BITARTRATE AND ACETAMINOPHEN 1 TABLET: 5; 325 TABLET ORAL at 08:33

## 2018-09-24 RX ADMIN — HYDROCODONE BITARTRATE AND ACETAMINOPHEN 1 TABLET: 5; 325 TABLET ORAL at 18:22

## 2018-09-24 RX ADMIN — ACETAMINOPHEN 1000 MG: 500 TABLET, FILM COATED ORAL at 02:19

## 2018-09-24 RX ADMIN — FAMOTIDINE 20 MG: 20 TABLET, FILM COATED ORAL at 08:34

## 2018-09-24 RX ADMIN — Medication 2 G/HR: at 07:57

## 2018-09-24 RX ADMIN — DOCUSATE SODIUM 100 MG: 100 CAPSULE, LIQUID FILLED ORAL at 18:22

## 2018-09-24 RX ADMIN — DEXTROSE AND SODIUM CHLORIDE 96 ML/HR: 5; 200 INJECTION, SOLUTION INTRAVENOUS at 07:57

## 2018-09-24 RX ADMIN — HUMAN RHO(D) IMMUNE GLOBULIN 1500 UNITS: 1500 SOLUTION INTRAMUSCULAR; INTRAVENOUS at 11:46

## 2018-09-24 NOTE — ANESTHESIA POSTPROCEDURE EVALUATION
Patient: Bibi Reyez    Procedure Summary     Date:  18 [facility]   [facility] LABOR DELIVERY    Anesthesia Start:  160 Anesthesia Stop:      Procedure:   SECTION REPEAT (N/A Abdomen) Diagnosis:      Surgeon:  Susan Scott MD Provider:  Kiah Garcia DO    Anesthesia Type:  spinal, ITN ASA Status:  3          Anesthesia Type: spinal, ITN  Last vitals  BP   101/51 (18 0656)   Temp   97.6 °F (36.4 °C) (18)   Pulse   82 (18)   Resp   16 (18)     SpO2   98 % (18 1825)     Post Anesthesia Care and Evaluation    Patient location during evaluation: bedside  Patient participation: complete - patient participated  Level of consciousness: awake and alert  Pain management: adequate  Airway patency: patent  Anesthetic complications: No anesthetic complications    Cardiovascular status: acceptable  Respiratory status: acceptable  Hydration status: acceptable  Post Neuraxial Block status: Motor and sensory function returned to baseline and No signs or symptoms of PDPH

## 2018-09-25 LAB
BACTERIA SPEC AEROBE CULT: NORMAL
BASOPHILS # BLD AUTO: 0.01 10*3/MM3 (ref 0–0.2)
BASOPHILS NFR BLD AUTO: 0.1 % (ref 0–1)
DEPRECATED RDW RBC AUTO: 41.8 FL (ref 37–54)
EOSINOPHIL # BLD AUTO: 0.07 10*3/MM3 (ref 0–0.3)
EOSINOPHIL NFR BLD AUTO: 0.6 % (ref 0–3)
ERYTHROCYTE [DISTWIDTH] IN BLOOD BY AUTOMATED COUNT: 14.1 % (ref 11.3–14.5)
HCT VFR BLD AUTO: 30.7 % (ref 34.5–44)
HGB BLD-MCNC: 9.6 G/DL (ref 11.5–15.5)
IMM GRANULOCYTES # BLD: 0.04 10*3/MM3 (ref 0–0.03)
IMM GRANULOCYTES NFR BLD: 0.4 % (ref 0–0.6)
LYMPHOCYTES # BLD AUTO: 2.46 10*3/MM3 (ref 0.6–4.8)
LYMPHOCYTES NFR BLD AUTO: 22.5 % (ref 24–44)
MCH RBC QN AUTO: 25.5 PG (ref 27–31)
MCHC RBC AUTO-ENTMCNC: 31.3 G/DL (ref 32–36)
MCV RBC AUTO: 81.4 FL (ref 80–99)
MONOCYTES # BLD AUTO: 1.06 10*3/MM3 (ref 0–1)
MONOCYTES NFR BLD AUTO: 9.7 % (ref 0–12)
NEUTROPHILS # BLD AUTO: 7.31 10*3/MM3 (ref 1.5–8.3)
NEUTROPHILS NFR BLD AUTO: 66.7 % (ref 41–71)
PLATELET # BLD AUTO: 230 10*3/MM3 (ref 150–450)
PMV BLD AUTO: 11.8 FL (ref 6–12)
RBC # BLD AUTO: 3.77 10*6/MM3 (ref 3.89–5.14)
WBC NRBC COR # BLD: 10.95 10*3/MM3 (ref 3.5–10.8)

## 2018-09-25 PROCEDURE — 85025 COMPLETE CBC W/AUTO DIFF WBC: CPT | Performed by: OBSTETRICS & GYNECOLOGY

## 2018-09-25 PROCEDURE — 63710000001 DIPHENHYDRAMINE PER 50 MG: Performed by: OBSTETRICS & GYNECOLOGY

## 2018-09-25 RX ORDER — DIAPER,BRIEF,INFANT-TODD,DISP
EACH MISCELLANEOUS EVERY 6 HOURS SCHEDULED
Status: DISCONTINUED | OUTPATIENT
Start: 2018-09-25 | End: 2018-09-27 | Stop reason: HOSPADM

## 2018-09-25 RX ORDER — DIPHENHYDRAMINE HCL 25 MG
25 CAPSULE ORAL ONCE
Status: COMPLETED | OUTPATIENT
Start: 2018-09-25 | End: 2018-09-25

## 2018-09-25 RX ADMIN — HYDROCODONE BITARTRATE AND ACETAMINOPHEN 1 TABLET: 5; 325 TABLET ORAL at 00:09

## 2018-09-25 RX ADMIN — DOCUSATE SODIUM 100 MG: 100 CAPSULE, LIQUID FILLED ORAL at 09:14

## 2018-09-25 RX ADMIN — HYDROCORTISONE 1 APPLICATION: 1 CREAM TOPICAL at 23:57

## 2018-09-25 RX ADMIN — ACETAMINOPHEN 1000 MG: 500 TABLET, FILM COATED ORAL at 16:18

## 2018-09-25 RX ADMIN — DIPHENHYDRAMINE HYDROCHLORIDE 25 MG: 25 CAPSULE ORAL at 15:16

## 2018-09-25 RX ADMIN — HYDROCODONE BITARTRATE AND ACETAMINOPHEN 1 TABLET: 5; 325 TABLET ORAL at 23:57

## 2018-09-25 RX ADMIN — SIMETHICONE CHEW TAB 80 MG 80 MG: 80 TABLET ORAL at 09:14

## 2018-09-25 RX ADMIN — HYDROCODONE BITARTRATE AND ACETAMINOPHEN 1 TABLET: 5; 325 TABLET ORAL at 09:09

## 2018-09-25 RX ADMIN — HYDROCORTISONE: 1 CREAM TOPICAL at 15:49

## 2018-09-26 LAB — NUMBER OF DOSES: NORMAL

## 2018-09-26 RX ADMIN — HYDROCODONE BITARTRATE AND ACETAMINOPHEN 1 TABLET: 5; 325 TABLET ORAL at 19:19

## 2018-09-26 RX ADMIN — ACETAMINOPHEN 1000 MG: 500 TABLET, FILM COATED ORAL at 14:04

## 2018-09-26 RX ADMIN — HYDROCORTISONE: 1 CREAM TOPICAL at 08:56

## 2018-09-26 RX ADMIN — FAMOTIDINE 20 MG: 20 TABLET, FILM COATED ORAL at 08:55

## 2018-09-26 RX ADMIN — HYDROCORTISONE: 1 CREAM TOPICAL at 15:28

## 2018-09-27 VITALS
TEMPERATURE: 98.5 F | OXYGEN SATURATION: 98 % | HEART RATE: 84 BPM | WEIGHT: 229 LBS | HEIGHT: 60 IN | DIASTOLIC BLOOD PRESSURE: 87 MMHG | BODY MASS INDEX: 44.96 KG/M2 | SYSTOLIC BLOOD PRESSURE: 130 MMHG | RESPIRATION RATE: 18 BRPM

## 2018-09-27 RX ORDER — HYDROMORPHONE HYDROCHLORIDE 1 MG/ML
0.5 INJECTION, SOLUTION INTRAMUSCULAR; INTRAVENOUS; SUBCUTANEOUS
Status: DISCONTINUED | OUTPATIENT
Start: 2018-09-27 | End: 2018-09-27 | Stop reason: HOSPADM

## 2018-09-27 RX ORDER — NALOXONE HCL 0.4 MG/ML
0.4 VIAL (ML) INJECTION
Status: DISCONTINUED | OUTPATIENT
Start: 2018-09-27 | End: 2018-09-27 | Stop reason: HOSPADM

## 2018-09-27 RX ORDER — HYDROCODONE BITARTRATE AND ACETAMINOPHEN 5; 325 MG/1; MG/1
1 TABLET ORAL EVERY 4 HOURS PRN
Qty: 35 TABLET | Refills: 0 | Status: SHIPPED | OUTPATIENT
Start: 2018-09-27 | End: 2018-10-03

## 2018-09-27 RX ORDER — IBUPROFEN 600 MG/1
600 TABLET ORAL EVERY 6 HOURS PRN
Qty: 35 TABLET | Refills: 1 | Status: SHIPPED | OUTPATIENT
Start: 2018-09-27 | End: 2018-09-28

## 2018-09-27 RX ORDER — ACETAMINOPHEN 325 MG/1
650 TABLET ORAL ONCE AS NEEDED
Status: DISCONTINUED | OUTPATIENT
Start: 2018-09-27 | End: 2018-09-27 | Stop reason: HOSPADM

## 2018-09-27 RX ADMIN — HYDROCODONE BITARTRATE AND ACETAMINOPHEN 1 TABLET: 5; 325 TABLET ORAL at 04:43

## 2018-09-27 RX ADMIN — FAMOTIDINE 20 MG: 20 TABLET, FILM COATED ORAL at 09:23

## 2018-09-28 RX ORDER — IBUPROFEN 600 MG/1
600 TABLET ORAL EVERY 6 HOURS PRN
Qty: 35 TABLET | Refills: 1 | Status: SHIPPED | OUTPATIENT
Start: 2018-09-28 | End: 2018-10-21

## 2018-10-14 ENCOUNTER — APPOINTMENT (OUTPATIENT)
Dept: PREADMISSION TESTING | Facility: HOSPITAL | Age: 28
End: 2018-10-14

## 2018-10-21 ENCOUNTER — OFFICE VISIT (OUTPATIENT)
Dept: RETAIL CLINIC | Facility: CLINIC | Age: 28
End: 2018-10-21

## 2018-10-21 VITALS
BODY MASS INDEX: 38.01 KG/M2 | HEART RATE: 79 BPM | TEMPERATURE: 98.7 F | DIASTOLIC BLOOD PRESSURE: 78 MMHG | WEIGHT: 193.6 LBS | HEIGHT: 60 IN | SYSTOLIC BLOOD PRESSURE: 112 MMHG | OXYGEN SATURATION: 98 % | RESPIRATION RATE: 20 BRPM

## 2018-10-21 DIAGNOSIS — J02.9 ACUTE VIRAL PHARYNGITIS: Primary | ICD-10-CM

## 2018-10-21 LAB
EXPIRATION DATE: NORMAL
INTERNAL CONTROL: NORMAL
Lab: NORMAL
S PYO AG THROAT QL: NEGATIVE

## 2018-10-21 PROCEDURE — 87880 STREP A ASSAY W/OPTIC: CPT | Performed by: NURSE PRACTITIONER

## 2018-10-21 PROCEDURE — 99213 OFFICE O/P EST LOW 20 MIN: CPT | Performed by: NURSE PRACTITIONER

## 2018-10-21 RX ORDER — ACETAMINOPHEN 500 MG
500 TABLET ORAL EVERY 6 HOURS PRN
COMMUNITY
End: 2019-07-28

## 2018-10-21 NOTE — PROGRESS NOTES
"Subjective   Bibi Reyez is a 27 y.o. female.   Chief Complaint   Patient presents with   • Sore Throat      28 yo w/f presents with complaint of sore throat duration of 2 days, also has a headache that is intermittent.  She denies fever.  She is breast feeding a 4 week old infant and is getting little sleep.  Refer to HPI/ROS for additional information.      Sore Throat    This is a new problem. The current episode started in the past 7 days. The problem has been waxing and waning. Neither side of throat is experiencing more pain than the other. There has been no fever. The pain is at a severity of 3/10. Associated symptoms include headaches. She has had exposure to strep. She has tried acetaminophen for the symptoms. The treatment provided mild relief.        The following portions of the patient's history were reviewed and updated as appropriate: allergies, current medications, past family history, past medical history, past social history, past surgical history and problem list.    Current Outpatient Prescriptions:   •  acetaminophen (TYLENOL) 500 MG tablet, Take 500 mg by mouth Every 6 (Six) Hours As Needed for Mild Pain ., Disp: , Rfl:   •  Prenatal Vit-Fe Fumarate-FA (PRENATAL 27-1) 27-1 MG tablet tablet, Take 1 tablet by mouth Daily., Disp: , Rfl:   •  raNITIdine (ZANTAC) 150 MG tablet, Take 150 mg by mouth Daily., Disp: , Rfl:     Review of Systems   Constitutional: Negative.    HENT: Positive for sore throat. Negative for postnasal drip, rhinorrhea, sinus pain and sneezing.    Eyes: Negative.    Respiratory: Negative.    Cardiovascular: Negative.    Gastrointestinal: Negative.    Skin: Negative.    Neurological: Positive for headaches.   Psychiatric/Behavioral: Negative.      /78   Pulse 79   Temp 98.7 °F (37.1 °C) (Oral)   Resp 20   Ht 152.4 cm (60\")   Wt 87.8 kg (193 lb 9.6 oz)   LMP 01/15/2018 (Exact Date)   SpO2 98%   Breastfeeding? Yes   BMI 37.81 kg/m²     Objective   Allergies "   Allergen Reactions   • Steri-Strip Compound Benzoin [Benzoin Compound] Rash       Physical Exam   Constitutional: She is oriented to person, place, and time. She appears well-developed and well-nourished. No distress.   HENT:   Head: Normocephalic.   Right Ear: External ear normal.   Left Ear: External ear normal.   Nose: Nose normal.   Mouth/Throat: Oropharynx is clear and moist.   Eyes: Conjunctivae are normal.   Neck: Normal range of motion. Neck supple. No JVD present. No tracheal deviation present. No thyromegaly present.   Cardiovascular: Normal rate, regular rhythm and normal heart sounds.    Pulmonary/Chest: Effort normal and breath sounds normal. No stridor. No respiratory distress. She has no wheezes. She has no rales. She exhibits no tenderness.   Lymphadenopathy:     She has no cervical adenopathy.   Neurological: She is alert and oriented to person, place, and time.   Skin: Skin is warm. Capillary refill takes less than 2 seconds. She is not diaphoretic.   Psychiatric: She has a normal mood and affect. Her behavior is normal.   Vitals reviewed.      Assessment/Plan   Bibi was seen today for sore throat.    Diagnoses and all orders for this visit:    Acute viral pharyngitis  -     POC Rapid Strep A      Results for orders placed or performed in visit on 10/21/18   POC Rapid Strep A   Result Value Ref Range    Rapid Strep A Screen Negative Negative, VALID, INVALID, Not Performed    Internal Control Passed Passed    Lot Number [id number]     Expiration Date 2/29/20            An After Visit Summary was printed, reviewed, and given to the patient. Understanding verbalized and agrees with treatment plan.  If no improvement or becomes worse, follow up with primary or go to [facility]/ER.        October 21, 2018 12:09 PM

## 2018-12-02 ENCOUNTER — OFFICE VISIT (OUTPATIENT)
Dept: RETAIL CLINIC | Facility: CLINIC | Age: 28
End: 2018-12-02

## 2018-12-02 VITALS
TEMPERATURE: 98.5 F | OXYGEN SATURATION: 97 % | RESPIRATION RATE: 20 BRPM | SYSTOLIC BLOOD PRESSURE: 102 MMHG | DIASTOLIC BLOOD PRESSURE: 70 MMHG | HEART RATE: 97 BPM

## 2018-12-02 DIAGNOSIS — N89.8 ITCHING IN THE VAGINAL AREA: Primary | ICD-10-CM

## 2018-12-02 DIAGNOSIS — Z20.828 EXPOSURE TO THE FLU: ICD-10-CM

## 2018-12-02 LAB
EXPIRATION DATE: NORMAL
FLUAV AG NPH QL: NORMAL
FLUBV AG NPH QL: NORMAL
INTERNAL CONTROL: NORMAL
Lab: NORMAL

## 2018-12-02 PROCEDURE — 99213 OFFICE O/P EST LOW 20 MIN: CPT | Performed by: NURSE PRACTITIONER

## 2018-12-02 PROCEDURE — 87804 INFLUENZA ASSAY W/OPTIC: CPT | Performed by: NURSE PRACTITIONER

## 2018-12-02 RX ORDER — FLUCONAZOLE 150 MG/1
150 TABLET ORAL ONCE
Qty: 2 TABLET | Refills: 0 | Status: SHIPPED | OUTPATIENT
Start: 2018-12-02 | End: 2018-12-02

## 2018-12-02 NOTE — PROGRESS NOTES
Subjective   Bibi Reyez is a 28 y.o. female. Presents with vaginal itching, burning, and white discharge for the last 2 days. Has used OTC vaginal cream with no improvement. Also is fatigued and having headaches. Her  diagnosed with Flu A today. Son born prematurely with 3 VSD. Wanted to get Flu screening to ensure that infant was not exposed. See ROS.   History of Present Illness     The following portions of the patient's history were reviewed and updated as appropriate: allergies, current medications, past family history, past medical history, past social history, past surgical history and problem list.    Review of Systems   Constitutional: Positive for fatigue. Negative for fever.   HENT: Positive for congestion (mild) and sore throat (mild). Negative for ear pain.    Respiratory: Negative.    Cardiovascular: Negative.    Gastrointestinal: Negative.    Genitourinary: Positive for vaginal discharge (white, thick). Negative for frequency, genital sores, hematuria, pelvic pain and urgency.   Musculoskeletal: Negative.    Neurological: Positive for headaches.       Objective   Physical Exam   Constitutional: She appears well-developed and well-nourished.   HENT:   Head: Normocephalic.   Right Ear: Tympanic membrane and ear canal normal.   Left Ear: Tympanic membrane and ear canal normal.   Nose: Nose normal.   Mouth/Throat: Uvula is midline, oropharynx is clear and moist and mucous membranes are normal.   Cardiovascular: Regular rhythm.   Pulmonary/Chest: Effort normal and breath sounds normal.   Genitourinary:   Genitourinary Comments: Exam deferred   Lymphadenopathy:     She has no cervical adenopathy.   Skin: Skin is warm, dry and intact.       Assessment/Plan   Bibi was seen today for vaginal itching and fatigue.    Diagnoses and all orders for this visit:    Itching in the vaginal area    Exposure to the flu  -     POC Influenza A / B    Other orders  -     fluconazole (DIFLUCAN) 150 MG tablet;  Take 1 tablet by mouth 1 (One) Time for 1 dose. Take 1 tablet now and repeat in 3 days        Results for orders placed or performed in visit on 12/02/18   POC Influenza A / B   Result Value Ref Range    Rapid Influenza A Ag neg Negative    Rapid Influenza B Ag neg Negative    Internal Control Passed Passed    Lot Number [id number]     Expiration Date 3/6/2021          Visit information reviewed with patient, including medication prescribed and patient instructions. All questions answered and given to patient/and or caregiver.     If symptoms worsen with fever >103, please seek further evaluation in the ER. Please F/U with PCP with concerns/and questions.     Callie Lozano, APRN

## 2019-04-05 ENCOUNTER — OFFICE VISIT (OUTPATIENT)
Dept: RETAIL CLINIC | Facility: CLINIC | Age: 29
End: 2019-04-05

## 2019-04-05 VITALS
TEMPERATURE: 99 F | HEART RATE: 100 BPM | SYSTOLIC BLOOD PRESSURE: 118 MMHG | WEIGHT: 214 LBS | DIASTOLIC BLOOD PRESSURE: 70 MMHG | HEIGHT: 60 IN | BODY MASS INDEX: 42.01 KG/M2 | OXYGEN SATURATION: 99 % | RESPIRATION RATE: 20 BRPM

## 2019-04-05 DIAGNOSIS — J06.9 URI WITH COUGH AND CONGESTION: Primary | ICD-10-CM

## 2019-04-05 DIAGNOSIS — R68.89 FLU-LIKE SYMPTOMS: ICD-10-CM

## 2019-04-05 LAB
EXPIRATION DATE: NORMAL
FLUAV AG NPH QL: NEGATIVE
FLUBV AG NPH QL: NEGATIVE
INTERNAL CONTROL: NORMAL
Lab: NORMAL

## 2019-04-05 PROCEDURE — 87804 INFLUENZA ASSAY W/OPTIC: CPT | Performed by: NURSE PRACTITIONER

## 2019-04-05 PROCEDURE — 99213 OFFICE O/P EST LOW 20 MIN: CPT | Performed by: NURSE PRACTITIONER

## 2019-04-05 RX ORDER — OSELTAMIVIR PHOSPHATE 75 MG/1
75 CAPSULE ORAL 2 TIMES DAILY
Qty: 10 CAPSULE | Refills: 0 | Status: SHIPPED | OUTPATIENT
Start: 2019-04-05 | End: 2019-04-10

## 2019-04-05 NOTE — PROGRESS NOTES
Subjective   Bibi Reyez is a 28 y.o. female.     PT has had flu like symptoms for the last 24 hours. She states that her temp has been around 100.3, sore throat, cough, congestion, body aches, and her kids were both positive for flu A      Influenza   This is a new problem. The current episode started in the past 7 days. The problem occurs constantly. The problem has been gradually worsening. Associated symptoms include arthralgias, chills, congestion, coughing, diaphoresis, fatigue, a fever, headaches, myalgias and a sore throat. Pertinent negatives include no abdominal pain, anorexia, change in bowel habit, chest pain, joint swelling, nausea, neck pain, numbness, rash, swollen glands, urinary symptoms, vertigo, visual change, vomiting or weakness. Nothing aggravates the symptoms. She has tried acetaminophen for the symptoms. The treatment provided no relief.        Current Outpatient Medications on File Prior to Visit   Medication Sig Dispense Refill   • acetaminophen (TYLENOL) 500 MG tablet Take 500 mg by mouth Every 6 (Six) Hours As Needed for Mild Pain .     • Prenatal Vit-Fe Fumarate-FA (PRENATAL 27-) 27-1 MG tablet tablet Take 1 tablet by mouth Daily.     • raNITIdine (ZANTAC) 150 MG tablet Take 150 mg by mouth Daily.       No current facility-administered medications on file prior to visit.        Allergies   Allergen Reactions   • Steri-Strip Compound Benzoin [Benzoin Compound] Rash       Past Medical History:   Diagnosis Date   • Preeclampsia        Past Surgical History:   Procedure Laterality Date   •  SECTION     •  SECTION N/A 2018    Procedure:  SECTION REPEAT;  Surgeon: Susan Scott MD;  Location: [facility] LABOR DELIVERY;  Service: Obstetrics/Gynecology   • CHOLECYSTECTOMY     • TONSILLECTOMY     • WISDOM TOOTH EXTRACTION         Family History   Problem Relation Age of Onset   • Cancer Mother        Social History     Socioeconomic History   • Marital  "status:      Spouse name: Not on file   • Number of children: Not on file   • Years of education: Not on file   • Highest education level: Not on file   Tobacco Use   • Smoking status: Never Smoker   • Smokeless tobacco: Never Used   Substance and Sexual Activity   • Alcohol use: No   • Drug use: No   • Sexual activity: Yes     Birth control/protection: Inserts       Review of Systems   Constitutional: Positive for appetite change, chills, diaphoresis, fatigue and fever. Negative for activity change.   HENT: Positive for congestion, postnasal drip, rhinorrhea and sore throat. Negative for sinus pressure, sinus pain, sneezing and voice change.    Eyes: Negative for pain, discharge, redness and itching.   Respiratory: Positive for cough. Negative for chest tightness.    Cardiovascular: Negative for chest pain.   Gastrointestinal: Positive for diarrhea (x2/ day for the last 2 days). Negative for abdominal pain, anorexia, change in bowel habit, nausea and vomiting.   Musculoskeletal: Positive for arthralgias and myalgias. Negative for joint swelling and neck pain.   Skin: Negative for rash.   Neurological: Positive for headaches. Negative for dizziness, vertigo, weakness and numbness.   Psychiatric/Behavioral: Negative for agitation.       /70   Pulse 100   Temp 99 °F (37.2 °C) (Oral)   Resp 20   Ht 152.4 cm (60\")   Wt 97.1 kg (214 lb)   SpO2 99%   BMI 41.79 kg/m²     Objective   Physical Exam   Constitutional: She is oriented to person, place, and time. She appears well-developed and well-nourished. She is cooperative. She appears ill.   HENT:   Head: Normocephalic and atraumatic.   Right Ear: Tympanic membrane, external ear and ear canal normal. There is tenderness.   Left Ear: Tympanic membrane, external ear and ear canal normal. There is tenderness.   Nose: Mucosal edema and rhinorrhea present. Right sinus exhibits no maxillary sinus tenderness and no frontal sinus tenderness. Left sinus exhibits " no maxillary sinus tenderness and no frontal sinus tenderness.   Mouth/Throat: Uvula is midline and mucous membranes are normal. Oropharyngeal exudate (PND) present. No posterior oropharyngeal erythema. Tonsils are 0 on the right. Tonsils are 0 on the left. No tonsillar exudate.   Eyes: Conjunctivae and lids are normal.   Cardiovascular: Normal heart sounds.   Pulmonary/Chest: Effort normal and breath sounds normal.   Abdominal: There is no tenderness.   Lymphadenopathy:     She has cervical adenopathy.   Neurological: She is alert and oriented to person, place, and time.   Skin: Skin is warm and dry. No rash noted.   Psychiatric: She has a normal mood and affect. Her speech is normal and behavior is normal.       Procedures None    Assessment/Plan   Diagnoses and all orders for this visit:    URI with cough and congestion    Flu-like symptoms  -     POC Influenza A / B    Other orders  -     oseltamivir (TAMIFLU) 75 MG capsule; Take 1 capsule by mouth 2 (Two) Times a Day for 5 days.        Results for orders placed or performed in visit on 04/05/19   POC Influenza A / B   Result Value Ref Range    Rapid Influenza A Ag Negative Negative    Rapid Influenza B Ag Negative Negative    Internal Control Passed Passed    Lot Number [id number]     Expiration Date 4/30/21        Follow up with PCP or go to the nearest emergency room if symptoms worsen or fail to improve.

## 2019-05-06 ENCOUNTER — TRANSCRIBE ORDERS (OUTPATIENT)
Dept: ADMINISTRATIVE | Facility: HOSPITAL | Age: 29
End: 2019-05-06

## 2019-05-06 DIAGNOSIS — H47.10 OPTIC DISK EDEMA: Primary | ICD-10-CM

## 2019-05-13 ENCOUNTER — HOSPITAL ENCOUNTER (OUTPATIENT)
Dept: GENERAL RADIOLOGY | Facility: HOSPITAL | Age: 29
Discharge: HOME | End: 2019-05-13
Admitting: RADIOLOGY

## 2019-05-13 VITALS
HEART RATE: 87 BPM | WEIGHT: 212 LBS | RESPIRATION RATE: 18 BRPM | TEMPERATURE: 98.1 F | DIASTOLIC BLOOD PRESSURE: 79 MMHG | BODY MASS INDEX: 41.62 KG/M2 | OXYGEN SATURATION: 100 % | SYSTOLIC BLOOD PRESSURE: 123 MMHG | HEIGHT: 60 IN

## 2019-05-13 DIAGNOSIS — H47.10 OPTIC DISK EDEMA: ICD-10-CM

## 2019-05-13 LAB — B-HCG UR QL: NEGATIVE

## 2019-05-13 PROCEDURE — 81025 URINE PREGNANCY TEST: CPT | Performed by: RADIOLOGY

## 2019-05-13 PROCEDURE — 77003 FLUOROGUIDE FOR SPINE INJECT: CPT

## 2019-05-13 RX ORDER — LIDOCAINE HYDROCHLORIDE 10 MG/ML
10 INJECTION, SOLUTION INFILTRATION; PERINEURAL ONCE
Status: COMPLETED | OUTPATIENT
Start: 2019-05-13 | End: 2019-05-13

## 2019-05-13 RX ADMIN — LIDOCAINE HYDROCHLORIDE 10 ML: 10 INJECTION, SOLUTION INFILTRATION; PERINEURAL at 11:22

## 2019-05-13 NOTE — POST-PROCEDURE NOTE
Radiology Procedure    Pre-procedure: procedure, risks discussed with patient. Patient indicated understanding and consented to procedure.     Procedure Performed: lumbar puncture     IV Sedation and/or Anesthesia:  No    Complications: none    Preliminary Findings: opening pressure 24cm H2O    Specimen Removed: none    Estimated Blood Loss:  0ml    Post-Procedure Diagnosis: pending    Post-Procedure Plan: encourage fluids, bed rest x 2 hours    Standard Discharge Instructions Given:yes     NOEMY Werner  05/13/19  11:14 AM

## 2019-05-14 ENCOUNTER — HOSPITAL ENCOUNTER (OUTPATIENT)
Dept: MRI IMAGING | Facility: HOSPITAL | Age: 29
Discharge: HOME | End: 2019-05-14
Admitting: OPHTHALMOLOGY

## 2019-05-14 DIAGNOSIS — H47.10 OPTIC DISK EDEMA: ICD-10-CM

## 2019-05-14 PROCEDURE — 70543 MRI ORBT/FAC/NCK W/O &W/DYE: CPT

## 2019-05-14 PROCEDURE — 0 GADOBENATE DIMEGLUMINE 529 MG/ML SOLUTION: Performed by: OPHTHALMOLOGY

## 2019-05-14 PROCEDURE — A9577 INJ MULTIHANCE: HCPCS | Performed by: OPHTHALMOLOGY

## 2019-05-14 RX ADMIN — GADOBENATE DIMEGLUMINE 20 ML: 529 INJECTION, SOLUTION INTRAVENOUS at 11:33

## 2019-06-27 ENCOUNTER — OFFICE VISIT (OUTPATIENT)
Dept: NEUROSURGERY | Facility: CLINIC | Age: 29
End: 2019-06-27

## 2019-06-27 VITALS
WEIGHT: 212.4 LBS | SYSTOLIC BLOOD PRESSURE: 120 MMHG | BODY MASS INDEX: 41.7 KG/M2 | TEMPERATURE: 98.2 F | HEIGHT: 60 IN | DIASTOLIC BLOOD PRESSURE: 82 MMHG

## 2019-06-27 DIAGNOSIS — H47.10 OPTIC DISC EDEMA: ICD-10-CM

## 2019-06-27 DIAGNOSIS — E34.8 PINEAL GLAND CYST: ICD-10-CM

## 2019-06-27 DIAGNOSIS — O16.3 HYPERTENSION AFFECTING PREGNANCY IN THIRD TRIMESTER: ICD-10-CM

## 2019-06-27 DIAGNOSIS — O26.893: ICD-10-CM

## 2019-06-27 DIAGNOSIS — O14.90 PRE-ECLAMPSIA, ANTEPARTUM: ICD-10-CM

## 2019-06-27 DIAGNOSIS — R51.9: ICD-10-CM

## 2019-06-27 DIAGNOSIS — H47.10 PAPILLEDEMA OF BOTH EYES: Primary | ICD-10-CM

## 2019-06-27 DIAGNOSIS — G93.2 IDIOPATHIC INTRACRANIAL HYPERTENSION: ICD-10-CM

## 2019-06-27 PROCEDURE — 99214 OFFICE O/P EST MOD 30 MIN: CPT | Performed by: PHYSICIAN ASSISTANT

## 2019-06-30 PROBLEM — O14.90 PREECLAMPSIA: Status: ACTIVE | Noted: 2019-06-30

## 2019-07-01 PROBLEM — O16.3 HYPERTENSION AFFECTING PREGNANCY IN THIRD TRIMESTER: Status: RESOLVED | Noted: 2018-09-21 | Resolved: 2019-07-01

## 2019-07-01 PROBLEM — O14.90 PREECLAMPSIA: Status: RESOLVED | Noted: 2019-06-30 | Resolved: 2019-07-01

## 2019-07-01 PROBLEM — R51.9 PREGNANCY HEADACHE, ANTEPARTUM, THIRD TRIMESTER: Status: RESOLVED | Noted: 2018-08-23 | Resolved: 2019-07-01

## 2019-07-01 PROBLEM — O26.893 PREGNANCY HEADACHE, ANTEPARTUM, THIRD TRIMESTER: Status: RESOLVED | Noted: 2018-08-23 | Resolved: 2019-07-01

## 2019-07-05 ENCOUNTER — HOSPITAL ENCOUNTER (OUTPATIENT)
Dept: MRI IMAGING | Facility: HOSPITAL | Age: 29
Discharge: HOME | End: 2019-07-05

## 2019-07-05 ENCOUNTER — HOSPITAL ENCOUNTER (OUTPATIENT)
Dept: MRI IMAGING | Facility: HOSPITAL | Age: 29
Discharge: HOME | End: 2019-07-05
Admitting: PHYSICIAN ASSISTANT

## 2019-07-05 DIAGNOSIS — H47.10 OPTIC DISC EDEMA: ICD-10-CM

## 2019-07-05 PROCEDURE — A9577 INJ MULTIHANCE: HCPCS | Performed by: PHYSICIAN ASSISTANT

## 2019-07-05 PROCEDURE — 0 GADOBENATE DIMEGLUMINE 529 MG/ML SOLUTION: Performed by: PHYSICIAN ASSISTANT

## 2019-07-05 PROCEDURE — 70545 MR ANGIOGRAPHY HEAD W/DYE: CPT

## 2019-07-05 PROCEDURE — 70544 MR ANGIOGRAPHY HEAD W/O DYE: CPT

## 2019-07-05 RX ADMIN — GADOBENATE DIMEGLUMINE 20 ML: 529 INJECTION, SOLUTION INTRAVENOUS at 15:38

## 2019-07-08 ENCOUNTER — OFFICE VISIT (OUTPATIENT)
Dept: NEUROSURGERY | Facility: CLINIC | Age: 29
End: 2019-07-08

## 2019-07-08 VITALS
WEIGHT: 212 LBS | DIASTOLIC BLOOD PRESSURE: 88 MMHG | HEIGHT: 60 IN | SYSTOLIC BLOOD PRESSURE: 144 MMHG | TEMPERATURE: 98.8 F | BODY MASS INDEX: 41.62 KG/M2

## 2019-07-08 DIAGNOSIS — H47.10 PAPILLEDEMA OF BOTH EYES: Primary | ICD-10-CM

## 2019-07-08 DIAGNOSIS — E34.8 PINEAL GLAND CYST: ICD-10-CM

## 2019-07-08 PROCEDURE — 99213 OFFICE O/P EST LOW 20 MIN: CPT | Performed by: NEUROLOGICAL SURGERY

## 2019-07-08 NOTE — PROGRESS NOTES
NAME: TOYA KOTHARI   DOS: 2019  : 1990  PCP: Provider, No Known    Chief Complaint:    Chief Complaint   Patient presents with   • Papilledema     F/u MRA, MRV   • Pineal Cyst       History of Present Illness:  28 y.o. 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  She denies any progressive visual issues her opening pressure was very low it is right around 20-23 she is being seen for possibility of a nerve sheath fenestration if needed    PMHX  Allergies:  Allergies   Allergen Reactions   • Steri-Strip Compound Benzoin [Benzoin Compound] Rash     Medications    Current Outpatient Medications:   •  acetaminophen (TYLENOL) 500 MG tablet, Take 500 mg by mouth Every 6 (Six) Hours As Needed for Mild Pain ., Disp: , Rfl:   Past Medical History:  Past Medical History:   Diagnosis Date   • Preeclampsia      Past Surgical History:  Past Surgical History:   Procedure Laterality Date   •  SECTION     •  SECTION N/A 2018    Procedure:  SECTION REPEAT;  Surgeon: Susan Scott MD;  Location: [facility] LABOR DELIVERY;  Service: Obstetrics/Gynecology   • CHOLECYSTECTOMY     • TONSILLECTOMY     • WISDOM TOOTH EXTRACTION       Social Hx:  Social History     Tobacco Use   • Smoking status: Never Smoker   • Smokeless tobacco: Never Used   Substance Use Topics   • Alcohol use: No   • Drug use: No     Family Hx:  Family History   Problem Relation Age of Onset   • Cancer Mother      Review of Systems:        Review of Systems   Constitutional: Negative for activity change, appetite change, chills, diaphoresis, fatigue, fever and unexpected weight change.   HENT: Negative for congestion, dental problem, drooling, ear discharge, ear pain, facial swelling, hearing loss, mouth sores, nosebleeds, postnasal drip,  rhinorrhea, sinus pressure, sneezing, sore throat, tinnitus, trouble swallowing and voice change.    Eyes: Negative for photophobia, pain, discharge, redness, itching and visual disturbance.   Respiratory: Negative for apnea, cough, choking, chest tightness, shortness of breath, wheezing and stridor.    Cardiovascular: Negative for chest pain, palpitations and leg swelling.   Gastrointestinal: Negative for abdominal distention, abdominal pain, anal bleeding, blood in stool, constipation, diarrhea, nausea, rectal pain and vomiting.   Endocrine: Negative for cold intolerance, heat intolerance, polydipsia, polyphagia and polyuria.   Genitourinary: Negative for decreased urine volume, difficulty urinating, dysuria, enuresis, flank pain, frequency, genital sores, hematuria and urgency.   Musculoskeletal: Negative for arthralgias, back pain, gait problem, joint swelling, myalgias, neck pain and neck stiffness.   Skin: Negative for color change, pallor, rash and wound.   Allergic/Immunologic: Negative for environmental allergies, food allergies and immunocompromised state.   Neurological: Negative for dizziness, tremors, seizures, syncope, facial asymmetry, speech difficulty, weakness, light-headedness, numbness and headaches.   Hematological: Negative for adenopathy. Does not bruise/bleed easily.   Psychiatric/Behavioral: Negative for agitation, behavioral problems, confusion, decreased concentration, dysphoric mood, hallucinations, self-injury, sleep disturbance and suicidal ideas. The patient is not nervous/anxious and is not hyperactive.    All other systems reviewed and are negative.     I have reviewed this note template and all pertinent parts of the review of systems social, family history, surgical history and medication list    Physical Examination:  Vitals:    07/08/19 1526   BP: 144/88   Temp: 98.8 °F (37.1 °C)      General Appearance:   Well developed, well nourished, well groomed, alert, and  cooperative.  Neurological examination:  Neurologic Exam  Vital signs were reviewed and documented in the chart  Patient appeared in good neurologic function with normal comprehension fluent speech  Mood and affect are normal  Sense of smell deferred    Pupils symmetric equally reactive funduscopic exam not visualized   Visual fields intact to confrontation  Extraocular movements intact  Face motor function is symmetric  Facial sensations normal  Hearing intact to finger rub hearing intact to finger rub  Tongue is midline  Palate symmetric  Swallowing normal  Shoulder shrug normal  She has no motor drift she is awake alert her pupils work    Review of Imaging/DATA:  She has a small pineal cyst she has no ventricular megaly her MR venogram and MRI of the head are unremarkable on my read  Diagnoses/Plan:    Ms. Reyez is a 28 y.o. 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 I would not recommend shunting at the present time if she fails conservative management I would recommend an optic nerve sheath fenestration at the discretion of Dr. Zabala.  I explained the risk benefits and expected outcomes of shunting as well as the reason to see us back sooner.  I also told her to report immediately to emergency room for lumbar puncture if she had progressive visual issues.

## 2019-08-21 ENCOUNTER — TRANSCRIBE ORDERS (OUTPATIENT)
Dept: ADMINISTRATIVE | Facility: HOSPITAL | Age: 29
End: 2019-08-21

## 2019-08-21 DIAGNOSIS — L29.9 ITCHING: Primary | ICD-10-CM

## 2019-08-27 ENCOUNTER — HOSPITAL ENCOUNTER (OUTPATIENT)
Dept: MAMMOGRAPHY | Facility: HOSPITAL | Age: 29
Discharge: HOME | End: 2019-08-27

## 2019-08-27 ENCOUNTER — HOSPITAL ENCOUNTER (OUTPATIENT)
Dept: ULTRASOUND IMAGING | Facility: HOSPITAL | Age: 29
Discharge: HOME | End: 2019-08-27
Admitting: OBSTETRICS & GYNECOLOGY

## 2019-08-27 DIAGNOSIS — L29.9 ITCHING: ICD-10-CM

## 2019-08-27 PROCEDURE — 77066 DX MAMMO INCL CAD BI: CPT

## 2019-08-27 PROCEDURE — 76642 ULTRASOUND BREAST LIMITED: CPT

## 2019-08-27 PROCEDURE — 76642 ULTRASOUND BREAST LIMITED: CPT | Performed by: RADIOLOGY

## 2019-08-27 PROCEDURE — 77066 DX MAMMO INCL CAD BI: CPT | Performed by: RADIOLOGY

## 2019-10-01 ENCOUNTER — OFFICE VISIT (OUTPATIENT)
Dept: RETAIL CLINIC | Facility: CLINIC | Age: 29
End: 2019-10-01

## 2019-10-01 VITALS
DIASTOLIC BLOOD PRESSURE: 78 MMHG | BODY MASS INDEX: 41.39 KG/M2 | SYSTOLIC BLOOD PRESSURE: 124 MMHG | HEIGHT: 60 IN | WEIGHT: 210.8 LBS | HEART RATE: 84 BPM | RESPIRATION RATE: 20 BRPM | TEMPERATURE: 98.8 F | OXYGEN SATURATION: 98 %

## 2019-10-01 DIAGNOSIS — J02.9 ACUTE VIRAL PHARYNGITIS: Primary | ICD-10-CM

## 2019-10-01 LAB
EXPIRATION DATE: NORMAL
INTERNAL CONTROL: NORMAL
Lab: NORMAL
S PYO AG THROAT QL: NEGATIVE

## 2019-10-01 PROCEDURE — 99213 OFFICE O/P EST LOW 20 MIN: CPT | Performed by: NURSE PRACTITIONER

## 2019-10-01 PROCEDURE — 87880 STREP A ASSAY W/OPTIC: CPT | Performed by: NURSE PRACTITIONER

## 2019-10-01 RX ORDER — IBUPROFEN 800 MG/1
800 TABLET ORAL EVERY 6 HOURS PRN
Qty: 30 TABLET | Refills: 0 | Status: SHIPPED | OUTPATIENT
Start: 2019-10-01 | End: 2022-11-07

## 2019-10-01 NOTE — PATIENT INSTRUCTIONS
Sore Throat  When you have a sore throat, your throat may:  · Hurt.  · Burn.  · Feel irritated.  · Feel scratchy.  Many things can cause a sore throat, including:  · An infection.  · Allergies.  · Dryness in the air.  · Smoke or pollution.  · Gastroesophageal reflux disease (GERD).  · A tumor.  A sore throat can be the first sign of another sickness. It can happen with other problems, like coughing or a fever. Most sore throats go away without treatment.  Follow these instructions at home:  · Take over-the-counter medicines only as told by your doctor.  · Drink enough fluids to keep your pee (urine) clear or pale yellow.  · Rest when you feel you need to.  · To help with pain, try:  ? Sipping warm liquids, such as broth, herbal tea, or warm water.  ? Eating or drinking cold or frozen liquids, such as frozen ice pops.  ? Gargling with a salt-water mixture 3-4 times a day or as needed. To make a salt-water mixture, add ½-1 tsp of salt in 1 cup of warm water. Mix it until you cannot see the salt anymore.  ? Sucking on hard candy or throat lozenges.  ? Putting a cool-mist humidifier in your bedroom at night.  ? Sitting in the bathroom with the door closed for 5-10 minutes while you run hot water in the shower.  · Do not use any tobacco products, such as cigarettes, chewing tobacco, and e-cigarettes. If you need help quitting, ask your doctor.  Contact a doctor if:  · You have a fever for more than 2-3 days.  · You keep having symptoms for more than 2-3 days.  · Your throat does not get better in 7 days.  · You have a fever and your symptoms suddenly get worse.  Get help right away if:  · You have trouble breathing.  · You cannot swallow fluids, soft foods, or your saliva.  · You have swelling in your throat or neck that gets worse.  · You keep feeling like you are going to throw up (vomit).  · You keep throwing up.  This information is not intended to replace advice given to you by your health care provider. Make sure  you discuss any questions you have with your health care provider.  Document Released: 09/26/2009 Document Revised: 08/13/2017 Document Reviewed: 10/07/2016  [employer] Interactive Patient Education © 2019 [employer]

## 2019-10-01 NOTE — PROGRESS NOTES
"Subjective   Bibi Reyez is a 28 y.o. female.   Chief Complaint   Patient presents with   • Sore Throat      27 yo w/f presents with complaint of sore throat 2/10 onset this am.  She denies fever, cough, congestion.  She has taken no medication prior to arrival at clinic.  Refer to HPI/ROS for additional information.      Sore Throat    This is a new problem. The current episode started today. The problem has been waxing and waning. Neither side of throat is experiencing more pain than the other. There has been no fever. The pain is at a severity of 2/10. Pertinent negatives include no congestion or trouble swallowing. She has had exposure to strep. Exposure to: significant other with strep last week. She has tried nothing for the symptoms.        The following portions of the patient's history were reviewed and updated as appropriate: allergies, current medications, past family history, past medical history, past social history, past surgical history and problem list.    Current Outpatient Medications:   •  ibuprofen (ADVIL,MOTRIN) 800 MG tablet, Take 1 tablet by mouth Every 6 (Six) Hours As Needed for Moderate Pain ., Disp: 30 tablet, Rfl: 0    Review of Systems   Constitutional: Negative.    HENT: Positive for sore throat. Negative for congestion, postnasal drip, rhinorrhea, sinus pressure, sinus pain, sneezing and trouble swallowing.    Eyes: Negative.    Respiratory: Negative.    Cardiovascular: Negative.    Skin: Negative.    Psychiatric/Behavioral: Negative.      /78   Pulse 84   Temp 98.8 °F (37.1 °C)   Resp 20   Ht 152.4 cm (60\")   Wt 95.6 kg (210 lb 12.8 oz)   SpO2 98%   BMI 41.17 kg/m²     Objective   Allergies   Allergen Reactions   • Chlorhexidine Rash   • Steri-Strip Compound Benzoin [Benzoin Compound] Rash       Physical Exam   Constitutional: She is oriented to person, place, and time. She appears well-developed and well-nourished. No distress.   HENT:   Head: Normocephalic. "   Right Ear: External ear normal.   Left Ear: External ear normal.   Nose: Nose normal.   Mouth/Throat: Uvula is midline, oropharynx is clear and moist and mucous membranes are normal. No oropharyngeal exudate, posterior oropharyngeal edema, posterior oropharyngeal erythema or tonsillar abscesses. Tonsils are 0 on the right. Tonsils are 0 on the left. No tonsillar exudate.   Eyes: Conjunctivae are normal.   Neck: Normal range of motion. Neck supple. No JVD present. No tracheal deviation present. No thyromegaly present.   Cardiovascular: Normal rate, regular rhythm, normal heart sounds and intact distal pulses.   Pulmonary/Chest: Effort normal and breath sounds normal.   Lymphadenopathy:     She has no cervical adenopathy.   Neurological: She is alert and oriented to person, place, and time.   Skin: Skin is warm. Capillary refill takes less than 2 seconds. She is not diaphoretic.   Psychiatric: She has a normal mood and affect. Her behavior is normal. Judgment and thought content normal.   Vitals reviewed.      Assessment/Plan   Bibi was seen today for sore throat.    Diagnoses and all orders for this visit:    Acute viral pharyngitis  -     POC Rapid Strep A    Other orders  -     ibuprofen (ADVIL,MOTRIN) 800 MG tablet; Take 1 tablet by mouth Every 6 (Six) Hours As Needed for Moderate Pain .        Results for orders placed or performed in visit on 10/01/19   POC Rapid Strep A   Result Value Ref Range    Rapid Strep A Screen Negative Negative, VALID, INVALID, Not Performed    Internal Control Passed Passed    Lot Number [id number]     Expiration Date 10/21/21              An After Visit Summary was printed, reviewed, and given to the patient. Understanding verbalized and agrees with treatment plan.  If no improvement or becomes worse, follow up with primary or go to [facility].     October 1, 2019 7:10 PM

## 2019-10-10 ENCOUNTER — OFFICE VISIT (OUTPATIENT)
Dept: RETAIL CLINIC | Facility: CLINIC | Age: 29
End: 2019-10-10

## 2019-10-10 VITALS
RESPIRATION RATE: 18 BRPM | HEIGHT: 60 IN | OXYGEN SATURATION: 98 % | WEIGHT: 210 LBS | TEMPERATURE: 99.4 F | HEART RATE: 116 BPM | DIASTOLIC BLOOD PRESSURE: 86 MMHG | BODY MASS INDEX: 41.23 KG/M2 | SYSTOLIC BLOOD PRESSURE: 118 MMHG

## 2019-10-10 DIAGNOSIS — J02.0 STREP PHARYNGITIS: Primary | ICD-10-CM

## 2019-10-10 DIAGNOSIS — H65.91 OTITIS MEDIA WITH EFFUSION, RIGHT: ICD-10-CM

## 2019-10-10 LAB
EXPIRATION DATE: ABNORMAL
INTERNAL CONTROL: ABNORMAL
Lab: ABNORMAL
S PYO AG THROAT QL: POSITIVE

## 2019-10-10 PROCEDURE — 87880 STREP A ASSAY W/OPTIC: CPT | Performed by: NURSE PRACTITIONER

## 2019-10-10 PROCEDURE — 99213 OFFICE O/P EST LOW 20 MIN: CPT | Performed by: NURSE PRACTITIONER

## 2019-10-10 RX ORDER — LORATADINE 10 MG/1
10 TABLET ORAL DAILY
Qty: 30 TABLET | Refills: 0 | Status: SHIPPED | OUTPATIENT
Start: 2019-10-10 | End: 2021-11-04

## 2019-10-10 RX ORDER — AMOXICILLIN 500 MG/1
500 CAPSULE ORAL 3 TIMES DAILY
Qty: 30 CAPSULE | Refills: 0 | Status: SHIPPED | OUTPATIENT
Start: 2019-10-10 | End: 2019-10-20

## 2019-10-10 RX ORDER — DIPHENHYDRAMINE HCL 25 MG
25 TABLET ORAL NIGHTLY PRN
Qty: 15 TABLET | Refills: 0 | Status: SHIPPED | OUTPATIENT
Start: 2019-10-10 | End: 2021-11-04

## 2019-10-10 NOTE — PATIENT INSTRUCTIONS
Otitis Media With Effusion  Strep Throat    Strep throat is an infection of the throat. It is caused by germs. Strep throat spreads from person to person because of coughing, sneezing, or close contact.  Follow these instructions at home:  Medicines  · Take over-the-counter and prescription medicines only as told by your doctor.  · Take your antibiotic medicine as told by your doctor. Do not stop taking the medicine even if you feel better.  · Have family members who also have a sore throat or fever go to a doctor.  Eating and drinking  · Do not share food, drinking cups, or personal items.  · Try eating soft foods until your sore throat feels better.  · Drink enough fluid to keep your pee (urine) clear or pale yellow.  General instructions  · Rinse your mouth (gargle) with a salt-water mixture 3-4 times per day or as needed. To make a salt-water mixture, stir ½-1 tsp of salt into 1 cup of warm water.  · Make sure that all people in your house wash their hands well.  · Rest.  · Stay home from school or work until you have been taking antibiotics for 24 hours.  · Keep all follow-up visits as told by your doctor. This is important.  Contact a doctor if:  · Your neck keeps getting bigger.  · You get a rash, cough, or earache.  · You cough up thick liquid that is green, yellow-brown, or bloody.  · You have pain that does not get better with medicine.  · Your problems get worse instead of getting better.  · You have a fever.  Get help right away if:  · You throw up (vomit).  · You get a very bad headache.  · You neck hurts or it feels stiff.  · You have chest pain or you are short of breath.  · You have drooling, very bad throat pain, or changes in your voice.  · Your neck is swollen or the skin gets red and tender.  · Your mouth is dry or you are peeing less than normal.  · You keep feeling more tired or it is hard to wake up.  · Your joints are red or they hurt.  This information is not intended to replace advice given  to you by your health care provider. Make sure you discuss any questions you have with your health care provider.  Document Released: 06/05/2009 Document Revised: 08/16/2017 Document Reviewed: 04/11/2016  [employer] Interactive Patient Education © 2019 [employer]  Otitis media with effusion (OME) occurs when there is inflammation of the middle ear and fluid in the middle ear space. There are no signs and symptoms of infection. The middle ear space contains air and the bones for hearing. Air in the middle ear space helps to transmit sound to the brain.  OME is a common condition in children, and it often occurs after an ear infection. This condition may be present for several weeks or longer after an ear infection. Most cases of this condition get better on their own.  What are the causes?  OME is caused by a blockage of the eustachian tube in one or both ears. These tubes drain fluid in the ears to the back of the nose (nasopharynx). If the tissue in the tube swells up (edema), the tube closes. This prevents fluid from draining. Blockage can be caused by:  · Ear infections.  · Colds and other upper respiratory infections.  · Allergies.  · Irritants, such as tobacco smoke.  · Enlarged adenoids. The adenoids are areas of soft tissue located high in the back of the throat, behind the nose and the roof of the mouth. They are part of the body’s natural defense (immune) system.  · A mass in the nasopharynx.  · Damage to the ear caused by pressure changes (barotrauma).  What increases the risk?  Your child is more likely to develop this condition if:  · He or she has repeated ear and sinus infections.  · He or she has allergies.  · He or she is exposed to tobacco smoke.  · He or she attends .  · He or she is not .  What are the signs or symptoms?  Symptoms of this condition may not be obvious. Sometimes this condition does not have any symptoms, or symptoms may overlap with those of a cold or upper  "respiratory tract illness.  Symptoms of this condition include:  · Temporary hearing loss.  · A feeling of fullness in the ear without pain.  · Irritability or agitation.  · Balance (vestibular) problems.  As a result of hearing loss, your child may:  · Listen to the TV at a loud volume.  · Not respond to questions.  · Ask \"What?\" often when spoken to.  · Mistake or confuse one sound or word for another.  · Perform poorly at school.  · Have a poor attention span.  · Become agitated or irritated easily.  How is this diagnosed?  This condition is diagnosed with an ear exam. Your child's health care provider will look inside your child's ear with an instrument (otoscope) to check for redness, swelling, and fluid.  Other tests may be done, including:  · A test to check the movement of the eardrum (pneumatic otoscopy). This is done by squeezing a small amount of air into the ear.  · A test that changes air pressure in the middle ear to check how well the eardrum moves and to see if the eustachian tube is working (tympanogram).  · Hearing test (audiogram). This test involves playing tones at different pitches to see if your child can hear each tone.  How is this treated?  Treatment for this condition depends on the cause. In many cases, the fluid goes away on its own.  In some cases, your child may need a procedure to create a hole in the eardrum to allow fluid to drain (myringotomy) and to insert small drainage tubes (tympanostomy tubes) into the eardrums. These tubes help to drain fluid and prevent infection. This procedure may be recommended if:  · OME does not get better over several months.  · Your child has many ear infections within several months.  · Your child has noticeable hearing loss.  · Your child has problems with speech and language development.  Surgery may also be done to remove the adenoids (adenoidectomy).  Follow these instructions at home:  · Give over-the-counter and prescription medicines only as " told by your child's health care provider.  · Keep children away from any tobacco smoke.  · Keep all follow-up visits as told by your child's health care provider. This is important.  How is this prevented?  · Keep your child's vaccinations up to date. Make sure your child gets all recommended vaccinations, including a pneumonia and flu vaccine.  · Encourage hand washing. Your child should wash his or her hands often with soap and water. If there is no soap and water, he or she should use hand .  · Avoid exposing your child to tobacco smoke.  · Breastfeed your baby, if possible. Babies who are  as long as possible are less likely to develop this condition.  Contact a health care provider if:  · Your child's hearing does not get better after 3 months.  · Your child's hearing is worse.  · Your child has ear pain.  · Your child has a fever.  · Your child has drainage from the ear.  · Your child is dizzy.  · Your child has a lump on his or her neck.  Get help right away if:  · Your child has bleeding from the nose.  · Your child cannot move part of her or his face.  · Your child has trouble breathing.  · Your child cannot smell.  · Your child develops severe congestion.  · Your child develops weakness.  · Your child who is younger than 3 months has a temperature of 100°F (38°C) or higher.  Summary  · Otitis media with effusion (OME) occurs when there is inflammation of the middle ear and fluid in the middle ear space.  · This condition is caused by blockage of one or both eustachian tubes, which drain fluid in the ears to the back of the nose.  · Symptoms of this condition can include temporary hearing loss, a feeling of fullness in the ear, irritability or agitation, and balance (vestibular) problems. Sometimes, there are no symptoms.  · This condition is diagnosed with an ear exam and tests, such as pneumatic otoscopy, tympanogram, and audiogram.  · Treatment for this condition depends on the cause.  In many cases, the fluid goes away on its own.  This information is not intended to replace advice given to you by your health care provider. Make sure you discuss any questions you have with your health care provider.  Document Released: 03/09/2005 Document Revised: 11/09/2017 Document Reviewed: 11/09/2017  [employer] Interactive Patient Education © 2019 [employer]

## 2019-10-14 NOTE — PROGRESS NOTES
Subjective   Bibi Reyez is a 28 y.o. female.   Chief Complaint   Patient presents with   • Earache     Right   • Sore Throat      27 yo w/f presents with complaint of sore throat, 6/10, and right ear pressure/clogged feeling duration of 2 days.  Reports fever of 100.4 noted previous date.  Refer to HPI/ROS for additional information.      Earache    There is pain in the right ear. This is a new problem. The problem has been waxing and waning. The maximum temperature recorded prior to her arrival was 100.4 - 100.9 F. The pain is at a severity of 0/10 (pressure). Associated symptoms include rhinorrhea and a sore throat. She has tried acetaminophen for the symptoms. The treatment provided no relief.   Sore Throat    Associated symptoms include ear pain. She has had exposure to strep. She has tried acetaminophen for the symptoms. The treatment provided mild relief.        The following portions of the patient's history were reviewed and updated as appropriate: allergies, current medications, past family history, past medical history, past social history, past surgical history and problem list.    Current Outpatient Medications:   •  amoxicillin (AMOXIL) 500 MG capsule, Take 1 capsule by mouth 3 (Three) Times a Day for 10 days., Disp: 30 capsule, Rfl: 0  •  diphenhydrAMINE (BENADRYL ALLERGY) 25 MG tablet, Take 1 tablet by mouth At Night As Needed for Allergies., Disp: 15 tablet, Rfl: 0  •  ibuprofen (ADVIL,MOTRIN) 800 MG tablet, Take 1 tablet by mouth Every 6 (Six) Hours As Needed for Moderate Pain ., Disp: 30 tablet, Rfl: 0  •  loratadine (CLARITIN) 10 MG tablet, Take 1 tablet by mouth Daily., Disp: 30 tablet, Rfl: 0  •  silver sulfadiazine (SILVADENE, SSD) 1 % cream, Apply  topically to the appropriate area as directed 2 (Two) Times a Day. Cover with nonadherent dressing, Disp: 50 g, Rfl: 0    Review of Systems   Constitutional: Positive for activity change and fever.   HENT: Positive for ear pain, rhinorrhea  "and sore throat. Negative for sinus pressure and sinus pain.    Eyes: Negative.    Respiratory: Negative.    Cardiovascular: Negative.    Skin: Negative.    Psychiatric/Behavioral: Negative.      /86 (BP Location: Left arm, Patient Position: Sitting)   Pulse 116   Temp 99.4 °F (37.4 °C) (Oral)   Resp 18   Ht 152.4 cm (60\")   Wt 95.3 kg (210 lb)   LMP 09/20/2019   SpO2 98%   BMI 41.01 kg/m²     Objective   Allergies   Allergen Reactions   • Chlorhexidine Rash   • Steri-Strip Compound Benzoin [Benzoin Compound] Rash       Physical Exam   Constitutional: She is oriented to person, place, and time. She appears well-developed and well-nourished. No distress.   HENT:   Head: Normocephalic.   Right Ear: External ear and ear canal normal. A middle ear effusion is present.   Left Ear: Hearing, tympanic membrane, external ear and ear canal normal.   Nose: Mucosal edema present. Right sinus exhibits no maxillary sinus tenderness and no frontal sinus tenderness. Left sinus exhibits no maxillary sinus tenderness and no frontal sinus tenderness.   Mouth/Throat: Uvula is midline and mucous membranes are normal. Posterior oropharyngeal erythema present. Tonsils are 0 on the right. Tonsils are 0 on the left. No tonsillar exudate.   Eyes: Conjunctivae are normal.   Neck: Normal range of motion. Neck supple. No JVD present. No tracheal deviation present. No thyromegaly present.   Cardiovascular: Normal rate, regular rhythm and normal heart sounds.   Pulmonary/Chest: Effort normal and breath sounds normal.   Lymphadenopathy:     She has cervical adenopathy.   Neurological: She is alert and oriented to person, place, and time.   Skin: Skin is warm and dry. Capillary refill takes less than 2 seconds. She is not diaphoretic.   Psychiatric: She has a normal mood and affect. Her behavior is normal. Judgment and thought content normal.   Vitals reviewed.      Assessment/Plan   Bibi was seen today for earache and sore " throat.    Diagnoses and all orders for this visit:    Strep pharyngitis  -     POC Rapid Strep A    Otitis media with effusion, right    Other orders  -     loratadine (CLARITIN) 10 MG tablet; Take 1 tablet by mouth Daily.  -     diphenhydrAMINE (BENADRYL ALLERGY) 25 MG tablet; Take 1 tablet by mouth At Night As Needed for Allergies.  -     amoxicillin (AMOXIL) 500 MG capsule; Take 1 capsule by mouth 3 (Three) Times a Day for 10 days.          Results for orders placed or performed in visit on 10/10/19   POC Rapid Strep A   Result Value Ref Range    Rapid Strep A Screen Positive (A) Negative, VALID, INVALID, Not Performed    Internal Control Passed Passed    Lot Number [id number]     Expiration Date 01/17/2022            An After Visit Summary was printed, reviewed, and given to the patient. Understanding verbalized and agrees with treatment plan.  If no improvement or becomes worse, follow up with primary or go to [facility].     October 14, 2019 9:35 AM

## 2020-01-07 ENCOUNTER — OFFICE VISIT (OUTPATIENT)
Dept: RETAIL CLINIC | Facility: CLINIC | Age: 30
End: 2020-01-07

## 2020-01-07 VITALS
HEART RATE: 109 BPM | OXYGEN SATURATION: 99 % | HEIGHT: 60 IN | WEIGHT: 210 LBS | SYSTOLIC BLOOD PRESSURE: 124 MMHG | RESPIRATION RATE: 18 BRPM | TEMPERATURE: 98.4 F | BODY MASS INDEX: 41.23 KG/M2 | DIASTOLIC BLOOD PRESSURE: 86 MMHG

## 2020-01-07 DIAGNOSIS — H65.01 RIGHT ACUTE SEROUS OTITIS MEDIA, RECURRENCE NOT SPECIFIED: ICD-10-CM

## 2020-01-07 DIAGNOSIS — J02.9 SORE THROAT: Primary | ICD-10-CM

## 2020-01-07 LAB
EXPIRATION DATE: NORMAL
INTERNAL CONTROL: NORMAL
Lab: NORMAL
S PYO AG THROAT QL: NEGATIVE

## 2020-01-07 PROCEDURE — 87880 STREP A ASSAY W/OPTIC: CPT | Performed by: NURSE PRACTITIONER

## 2020-01-07 PROCEDURE — 99213 OFFICE O/P EST LOW 20 MIN: CPT | Performed by: NURSE PRACTITIONER

## 2020-01-07 RX ORDER — AMOXICILLIN 875 MG/1
875 TABLET, COATED ORAL 2 TIMES DAILY
Qty: 20 TABLET | Refills: 0 | Status: SHIPPED | OUTPATIENT
Start: 2020-01-07 | End: 2021-11-04
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
## 2020-01-07 NOTE — PATIENT INSTRUCTIONS
Earache, Adult  An earache, or ear pain, can be caused by many things, including:  · An infection.  · Ear wax buildup.  · Ear pressure.  · Something in the ear that should not be there (foreign body).  · A sore throat.  · Tooth problems.  · Jaw problems.  Treatment of the earache will depend on the cause. If the cause is not clear or cannot be determined, you may need to watch your symptoms until your earache goes away or until a cause is found.  Follow these instructions at home:  Pay attention to any changes in your symptoms. Take these actions to help with your pain:  · Take or apply over-the-counter and prescription medicines only as told by your health care provider.  · If you were prescribed an antibiotic medicine, use it as told by your health care provider. Do not stop using the antibiotic even if you start to feel better.  · Do not put anything in your ear other than medicine that is prescribed by your health care provider.  · If directed, apply heat to the affected area as often as told by your health care provider. Use the heat source that your health care provider recommends, such as a moist heat pack or a heating pad.  ? Place a towel between your skin and the heat source.  ? Leave the heat on for 20-30 minutes.  ? Remove the heat if your skin turns bright red. This is especially important if you are unable to feel pain, heat, or cold. You may have a greater risk of getting burned.  · If directed, put ice on the ear:  ? Put ice in a plastic bag.  ? Place a towel between your skin and the bag.  ? Leave the ice on for 20 minutes, 2-3 times a day.  · Try resting in an upright position instead of lying down. This may help to reduce pressure in your ear and relieve pain.  · Chew gum if it helps to relieve your ear pain.  · Treat any allergies as told by your health care provider.  · Keep all follow-up visits as told by your health care provider. This is important.  Contact a health care provider if:  · Your  pain does not improve within 2 days.  · Your earache gets worse.  · You have new symptoms.  · You have a fever.  Get help right away if:  · You have a severe headache.  · You have a stiff neck.  · You have trouble swallowing.  · You have redness or swelling behind your ear.  · You have fluid or blood coming from your ear.  · You have hearing loss.  · You feel dizzy.  This information is not intended to replace advice given to you by your health care provider. Make sure you discuss any questions you have with your health care provider.  Document Released: 08/04/2005 Document Revised: 08/15/2017 Document Reviewed: 06/12/2017  [employer] Interactive Patient Education © 2019 [employer]  Pharyngitis    Pharyngitis is redness, pain, and swelling (inflammation) of the throat (pharynx). It is a very common cause of sore throat. Pharyngitis can be caused by a bacteria, but it is usually caused by a virus. Most cases of pharyngitis get better on their own without treatment.  What are the causes?  This condition may be caused by:  · Infection by viruses (viral). Viral pharyngitis spreads from person to person (is contagious) through coughing, sneezing, and sharing of personal items or utensils such as cups, forks, spoons, and toothbrushes.  · Infection by bacteria (bacterial). Bacterial pharyngitis may be spread by touching the nose or face after coming in contact with the bacteria, or through more intimate contact, such as kissing.  · Allergies. Allergies can cause buildup of mucus in the throat (post-nasal drip), leading to inflammation and irritation. Allergies can also cause blocked nasal passages, forcing breathing through the mouth, which dries and irritates the throat.  What increases the risk?  You are more likely to develop this condition if:  · You are 5-24 years old.  · You are exposed to crowded environments such as , school, or dormitory living.  · You live in a cold climate.  · You have a weakened  disease-fighting (immune) system.  What are the signs or symptoms?  Symptoms of this condition vary by the cause (viral, bacterial, or allergies) and can include:  · Sore throat.  · Fatigue.  · Low-grade fever.  · Headache.  · Joint pain and muscle aches.  · Skin rashes.  · Swollen glands in the throat (lymph nodes).  · Plaque-like film on the throat or tonsils. This is often a symptom of bacterial pharyngitis.  · Vomiting.  · Stuffy nose (nasal congestion).  · Cough.  · Red, itchy eyes (conjunctivitis).  · Loss of appetite.  How is this diagnosed?  This condition is often diagnosed based on your medical history and a physical exam. Your health care provider will ask you questions about your illness and your symptoms. A swab of your throat may be done to check for bacteria (rapid strep test). Other lab tests may also be done, depending on the suspected cause, but these are rare.  How is this treated?  This condition usually gets better in 3-4 days without medicine. Bacterial pharyngitis may be treated with antibiotic medicines.  Follow these instructions at home:  · Take over-the-counter and prescription medicines only as told by your health care provider.  ? If you were prescribed an antibiotic medicine, take it as told by your health care provider. Do not stop taking the antibiotic even if you start to feel better.  ? Do not give children aspirin because of the association with Reye syndrome.  · Drink enough water and fluids to keep your urine clear or pale yellow.  · Get a lot of rest.  · Gargle with a salt-water mixture 3-4 times a day or as needed. To make a salt-water mixture, completely dissolve ½-1 tsp of salt in 1 cup of warm water.  · If your health care provider approves, you may use throat lozenges or sprays to soothe your throat.  Contact a health care provider if:  · You have large, tender lumps in your neck.  · You have a rash.  · You cough up green, yellow-brown, or bloody spit.  Get help right away  if:  · Your neck becomes stiff.  · You drool or are unable to swallow liquids.  · You cannot drink or take medicines without vomiting.  · You have severe pain that does not go away, even after you take medicine.  · You have trouble breathing, and it is not caused by a stuffy nose.  · You have new pain and swelling in your joints such as the knees, ankles, wrists, or elbows.  Summary  · Pharyngitis is redness, pain, and swelling (inflammation) of the throat (pharynx).  · While pharyngitis can be caused by a bacteria, the most common causes are viral.  · Most cases of pharyngitis get better on their own without treatment.  · Bacterial pharyngitis is treated with antibiotic medicines.  This information is not intended to replace advice given to you by your health care provider. Make sure you discuss any questions you have with your health care provider.  Document Released: 12/18/2006 Document Revised: 01/23/2018 Document Reviewed: 01/23/2018  [employer] Interactive Patient Education © 2019 [employer]

## 2020-05-18 ENCOUNTER — OFFICE VISIT (OUTPATIENT)
Dept: GASTROENTEROLOGY | Facility: CLINIC | Age: 30
End: 2020-05-18

## 2020-05-18 VITALS — WEIGHT: 215 LBS | TEMPERATURE: 97.3 F | BODY MASS INDEX: 42.21 KG/M2 | HEIGHT: 60 IN

## 2020-05-18 DIAGNOSIS — K52.9 COLITIS: Primary | ICD-10-CM

## 2020-05-18 DIAGNOSIS — K21.9 GASTROESOPHAGEAL REFLUX DISEASE, ESOPHAGITIS PRESENCE NOT SPECIFIED: ICD-10-CM

## 2020-05-18 DIAGNOSIS — Z83.71 FH: COLON POLYPS: ICD-10-CM

## 2020-05-18 DIAGNOSIS — Z80.0 FH: COLON CANCER: ICD-10-CM

## 2020-05-18 DIAGNOSIS — R93.5 ABNORMAL CT OF THE ABDOMEN: ICD-10-CM

## 2020-05-18 PROCEDURE — 99204 OFFICE O/P NEW MOD 45 MIN: CPT | Performed by: INTERNAL MEDICINE

## 2020-05-18 RX ORDER — ACETAZOLAMIDE 250 MG/1
TABLET ORAL
COMMUNITY
Start: 2020-05-14 | End: 2021-11-04
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
## 2020-06-03 ENCOUNTER — TRANSCRIBE ORDERS (OUTPATIENT)
Dept: SLEEP MEDICINE | Facility: HOSPITAL | Age: 30
End: 2020-06-03

## 2020-06-03 DIAGNOSIS — Z01.818 OTHER SPECIFIED PRE-OPERATIVE EXAMINATION: Primary | ICD-10-CM

## 2021-04-26 NOTE — PATIENT INSTRUCTIONS
Sore Throat  When you have a sore throat, your throat may:  · Hurt.  · Burn.  · Feel irritated.  · Feel scratchy.    Many things can cause a sore throat, including:  · An infection.  · Allergies.  · Dryness in the air.  · Smoke or pollution.  · Gastroesophageal reflux disease (GERD).  · A tumor.    A sore throat can be the first sign of another sickness. It can happen with other problems, like coughing or a fever. Most sore throats go away without treatment.  Follow these instructions at home:  · Take over-the-counter medicines only as told by your doctor.  · Drink enough fluids to keep your pee (urine) clear or pale yellow.  · Rest when you feel you need to.  · To help with pain, try:  ? Sipping warm liquids, such as broth, herbal tea, or warm water.  ? Eating or drinking cold or frozen liquids, such as frozen ice pops.  ? Gargling with a salt-water mixture 3-4 times a day or as needed. To make a salt-water mixture, add ½-1 tsp of salt in 1 cup of warm water. Mix it until you cannot see the salt anymore.  ? Sucking on hard candy or throat lozenges.  ? Putting a cool-mist humidifier in your bedroom at night.  ? Sitting in the bathroom with the door closed for 5-10 minutes while you run hot water in the shower.  · Do not use any tobacco products, such as cigarettes, chewing tobacco, and e-cigarettes. If you need help quitting, ask your doctor.  Contact a doctor if:  · You have a fever for more than 2-3 days.  · You keep having symptoms for more than 2-3 days.  · Your throat does not get better in 7 days.  · You have a fever and your symptoms suddenly get worse.  Get help right away if:  · You have trouble breathing.  · You cannot swallow fluids, soft foods, or your saliva.  · You have swelling in your throat or neck that gets worse.  · You keep feeling like you are going to throw up (vomit).  · You keep throwing up.  This information is not intended to replace advice given to you by your health care provider. Make  sure you discuss any questions you have with your health care provider.  Document Released: 09/26/2009 Document Revised: 08/13/2017 Document Reviewed: 10/07/2016  Elsevier Interactive Patient Education © 2018 [employer]     Initiate Treatment: Doxycycline x 1 week Plan: Start doxycycline for 1 week and doing warm compresses. Recommended seeing ophthalmologist if stye doesn’t go away soon; we discussed there is a more superior papule that I am I unsure is part of the stye and could need biopsy. Detail Level: Zone

## 2021-05-19 ENCOUNTER — TREATMENT (OUTPATIENT)
Dept: PHYSICAL THERAPY | Facility: CLINIC | Age: 31
End: 2021-05-19

## 2021-05-19 DIAGNOSIS — S63.124A CLOSED DISLOCATION OF INTERPHALANGEAL JOINT OF RIGHT THUMB, INITIAL ENCOUNTER: Primary | ICD-10-CM

## 2021-05-19 PROCEDURE — L3913 HFO W/O JOINTS CF: HCPCS | Performed by: PHYSICAL THERAPIST

## 2021-05-20 ENCOUNTER — TRANSCRIBE ORDERS (OUTPATIENT)
Dept: PHYSICAL THERAPY | Facility: CLINIC | Age: 31
End: 2021-05-20

## 2021-05-20 DIAGNOSIS — S63.124A CLOSED DISLOCATION OF INTERPHALANGEAL JOINT OF RIGHT THUMB, INITIAL ENCOUNTER: Primary | ICD-10-CM

## 2021-05-21 NOTE — PROGRESS NOTES
Bibi Michaelland 1990   Diagnosis/ Surgery: R thumb IP joint dislocation without fracture               Date Of Injury: 5/16/21    Date Of Surgery: NA     Hand Dominance: Right   History of Present Condition: pulling patient bed to bed and thumb got caught in tarp and dislocated.   Medical/Vocational History/ Medications: [employer]     Pain: 4/10    Edema: moderate   Sensibility: WNL   Wound Status:NA   ROM/ Strength: minimal tenderness     Splinting:  · Patient was measure and fit with a custom fabricated hand based thumb spica to tip.    · Patient was instructed in wearing schedule, precautions and care of the splint during this visit.   · Patient was instructed in proper donning/doffing of splint.   Assessment:  · Patient was fitted and appropriate splint was fabricated this date.  · Patient reported that splint was comfortable and had no complications with the fit of the splint.  · Patient was instructed and patient verbalized understanding of precautions, wear and care of the splint.   · Patient demonstrated independent donning/doffing of splint during treatment today.  Goals:  · Patient was fitted properly with appropriate splint for diagnosis  · Patient was educated on precautions, wear schedule and care of splint  · Patient demonstrated independence with donning/doffing of the splint.  · Splint was provided to Protect Healing Structures, Restrict Mobility, Improve joint alignment.  Plan:  · No additional treatment is required for this patient at this time. The patient is therefore discharged from therapy.  · Patient advised to contact therapist with any additional questions or concerns regarding the fit and function of the splint.  · Patient will be seen for splint issues as needed   · Wear Instructions: Off for hygiene       PT SIGNATURE: Sheila Gillespie, PT   DATE TREATMENT INITIATED: 5/21/2021    Physician Signature____________________________________ Date____________

## 2021-09-27 ENCOUNTER — OFFICE VISIT (OUTPATIENT)
Dept: OBSTETRICS AND GYNECOLOGY | Facility: CLINIC | Age: 31
End: 2021-09-27

## 2021-09-27 VITALS
BODY MASS INDEX: 42.84 KG/M2 | SYSTOLIC BLOOD PRESSURE: 122 MMHG | HEIGHT: 60 IN | DIASTOLIC BLOOD PRESSURE: 74 MMHG | WEIGHT: 218.2 LBS

## 2021-09-27 DIAGNOSIS — Z01.419 WOMEN'S ANNUAL ROUTINE GYNECOLOGICAL EXAMINATION: Primary | ICD-10-CM

## 2021-09-27 PROCEDURE — 99395 PREV VISIT EST AGE 18-39: CPT | Performed by: NURSE PRACTITIONER

## 2021-09-27 NOTE — PROGRESS NOTES
GYN Annual Exam     CC - Here for annual exam.        HPI  Bibi Reyez is a 30 y.o. female, , who presents for annual well woman exam. No LMP recorded (lmp unknown)..  Periods are absent, secondary to nexplanon.  Dysmenorrhea:occasional cramping one day per month.  Patient reports problems with: hair loss.  There were no changes to her medical or surgical history since her last visit.. Partner Status: Marital Status: .  She is sexually active. She has not had new partners since her last STD testing. She does not desire STD testing. .    Additional OB/GYN History   Current contraception: contraceptive methods: Nexplanon Nexplanon insertion date 10/28/2019  Desires to: continue contraception  Last Pap : 2019- ASCUS, hpv negative  Last Completed Pap Smear     This patient has no relevant Health Maintenance data.        History of abnormal Pap smear: yes - ascus, hpv negative  Family history of uterine, colon, breast, or ovarian cancer: no  Performs monthly Self-Breast Exam: yes  Exercises Regularly:no  Feelings of Anxiety or Depression: yes - anxiety  Tobacco Usage?: No   OB History        4    Para   4    Term   2       2    AB        Living   2       SAB        TAB        Ectopic        Molar        Multiple   0    Live Births   2                Health Maintenance   Topic Date Due   • Annual Gynecologic Pelvic and Breast Exam  Never done   • ANNUAL PHYSICAL  Never done   • COVID-19 Vaccine (1) Never done   • HEPATITIS C SCREENING  Never done   • PAP SMEAR  Never done   • INFLUENZA VACCINE  10/01/2021   • TDAP/TD VACCINES (3 - Td or Tdap) 10/05/2026   • Hepatitis B  Aged Out   • Pneumococcal Vaccine 0-64  Aged Out       The additional following portions of the patient's history were reviewed and updated as appropriate: allergies, current medications, past family history, past medical history, past social history, past surgical history and problem list.    Review of Systems  "  Constitutional: Negative.    HENT: Negative.    Eyes: Negative.    Respiratory: Negative.    Cardiovascular: Negative.    Gastrointestinal: Negative.    Endocrine: Negative.    Genitourinary: Negative.    Musculoskeletal: Negative.    Skin: Negative.    Allergic/Immunologic: Negative.    Neurological: Negative.    Hematological: Negative.    Psychiatric/Behavioral: The patient is nervous/anxious.          I have reviewed and agree with the HPI, ROS, and historical information as entered above. Mariaelena Reyes, LASHELL    Objective   /74   Ht 152.4 cm (60\")   Wt 99 kg (218 lb 3.2 oz)   LMP  (LMP Unknown)   Breastfeeding No   BMI 42.61 kg/m²     Physical Exam  Vitals and nursing note reviewed. Exam conducted with a chaperone present.   Constitutional:       Appearance: Normal appearance. She is obese.   Cardiovascular:      Rate and Rhythm: Normal rate and regular rhythm.   Chest:      Breasts:         Right: Normal.         Left: Normal.   Abdominal:      Palpations: Abdomen is soft.   Neurological:      Mental Status: She is alert.            Assessment and Plan    Problem List Items Addressed This Visit     None      Visit Diagnoses     Women's annual routine gynecological examination    -  Primary    Relevant Orders    Pap IG, Rfx HPV ASCU          1. GYN annual well woman exam.   2. Nexplanon expires 10/28/2022  3. Reviewed monthly self breast exams.  Instructed to call with lumps, pain, or breast discharge.    4. Reviewed exercise as a preventative health measures.   5. Reccommended Flu Vaccine in Fall of each year.  6. RTC in 1 year or PRN with problems        LASHELL Bermeo  09/27/2021  "

## 2021-10-05 DIAGNOSIS — Z01.419 WOMEN'S ANNUAL ROUTINE GYNECOLOGICAL EXAMINATION: ICD-10-CM

## 2022-08-24 ENCOUNTER — TELEPHONE (OUTPATIENT)
Dept: OBSTETRICS AND GYNECOLOGY | Facility: CLINIC | Age: 32
End: 2022-08-24

## 2022-11-07 ENCOUNTER — OFFICE VISIT (OUTPATIENT)
Dept: OBSTETRICS AND GYNECOLOGY | Facility: CLINIC | Age: 32
End: 2022-11-07

## 2022-11-07 VITALS
WEIGHT: 223 LBS | DIASTOLIC BLOOD PRESSURE: 76 MMHG | SYSTOLIC BLOOD PRESSURE: 118 MMHG | HEIGHT: 60 IN | BODY MASS INDEX: 43.78 KG/M2

## 2022-11-07 DIAGNOSIS — Z30.46 ENCOUNTER FOR REMOVAL AND REINSERTION OF NEXPLANON: Primary | ICD-10-CM

## 2022-11-07 PROBLEM — Z97.5 NEXPLANON IN PLACE: Status: ACTIVE | Noted: 2022-11-07

## 2022-11-07 LAB
B-HCG UR QL: NEGATIVE
EXPIRATION DATE: NORMAL
INTERNAL NEGATIVE CONTROL: NORMAL
INTERNAL POSITIVE CONTROL: NORMAL
Lab: NORMAL

## 2022-11-07 PROCEDURE — 81025 URINE PREGNANCY TEST: CPT | Performed by: OBSTETRICS & GYNECOLOGY

## 2022-11-07 PROCEDURE — 11983 REMOVE/INSERT DRUG IMPLANT: CPT | Performed by: OBSTETRICS & GYNECOLOGY

## 2022-11-07 NOTE — PROGRESS NOTES
Procedure: Nexplanon Removal and Reinsertion     Remove & Insert Drug Implant    Date/Time: 11/7/2022 8:44 AM  Performed by: Radha Carpio MD  Authorized by: Radha Carpio MD   Preparation: Patient was prepped and draped in the usual sterile fashion.  Local anesthesia used: yes  Anesthesia: local infiltration    Anesthesia:  Local anesthesia used: yes  Local Anesthetic: lidocaine 1% with epinephrine  Anesthetic total: 5 mL    Sedation:  Patient sedated: no    Patient tolerance: patient tolerated the procedure well with no immediate complications          Pre Dx: 1) Nexplanon removal and Reinsertion   Post Dx: 1) Nexplanon removal and Reinsertion     The risks, benefits, and alternatives to removal and reinsertion of the device have been discussed with the patient at length. All of her questions are answered. She is aware of the need for alternative means of contraception if desired. Verbal informed consent is obtained.    Patient does not have an allergy to betadine or shellfish.    Able to easily palpate the device in the  Left arm. Additional imaging was not required. The device feels freely mobile and easily accessible. She was placed in the examining table in a supine position with her arm flexed at the elbow and hand under her head. The skin over this site is prepped with Betadine. 2-3 mL of 1% lidocaine with epinephrine was injected.    Downward pressure was applied on the proximal end of the implant nearest the axilla, and a 2-3 mm incision was made in a longitudinal direction of the arm at the tip of the implant closest to the elbow. The implant was then pushed gently toward the incision until the tip was visible. The fibrous capsule was opened with blunt dissection using a hemostat. The implant was grasp with a hemostat and was easily removed intact. It measured a  full 4 cm in length.    Steristrip was placed over incision site as well as a pressure dressing.     Nexplanon  Insertion:    NDC#: 1073-3866-42  Lot #: [id number]  Exp date: 07/03/2024  BH device    Patient's LMP is absent due to Nexplanon  She did have a UPT in office today. The results were Negative.    The new insertion site on the Right arm was identified approximately 8-10 cm proximal to the humoral epicondyle and 3-4 cm below with sulcus of the triceps and biceps muscle. The skin at the insertion site was stretched by my thumb and index finger. The insertion site was anesthetized about 3 mL of 1% lidocaine. Nex, the needle tip was inserted, bevel side up, at an angle not greater than 30° to the skin surface, just until the skin was penetrated to below the bevel. The applicator was then lowered so that it was parallel to the arm. To minimize the chance of deep incision, the skin was tented upwards with the tip of the needle. The needle was then gently inserted to the full length and the device was fixed in place. I then slowly and carefully retracted the needle back by retracting the release lever. The obturator was seen in the device to determine that the nexplanon had been released.     Both the patient and I were easily able to feel the device under the skin. Steri-Strips were applied at the site, and the arm was gently wrapped with gauze.    There were no complications.   The patient tolerated the procedure well.    She was given a reminder card. She was advised to use condoms as a backup method for at least a week after insertion.     She understands that the device terms in three years.     Expectations, warning signs and limitations reviewed.     Radha Carpio MD  11/07/2022

## 2022-12-28 ENCOUNTER — TELEPHONE (OUTPATIENT)
Dept: OBSTETRICS AND GYNECOLOGY | Facility: CLINIC | Age: 32
End: 2022-12-28

## 2022-12-28 RX ORDER — FLUCONAZOLE 150 MG/1
TABLET ORAL
Qty: 3 TABLET | Refills: 0 | Status: SHIPPED | OUTPATIENT
Start: 2022-12-28

## 2022-12-28 NOTE — TELEPHONE ENCOUNTER
Dr. Carpio patient  Last annual: 09/27/2021  Next annual: 12/29/2022    S/w patient- patient reports vaginal itching, vaginal irritation, and thick/white discharge for the past 5 days. patient states that she has tried Monistat and Vagisil without relief in s/s. Patient denies vaginal odor. patient denies any new sexual partners. Informed patient that I will discuss this with a provider and CB with plan of care. She v/u.

## 2022-12-28 NOTE — TELEPHONE ENCOUNTER
PT STATES SHE IS WANTING TO BE SEEN TODAY FOR A YEAST INFECTION. PT STATES SHE IS HAVING ITCHING/BURNING FOR THE PAST 4 DAYS AND NOTHING IS HELPING HER. HUB UNBALE TO WT, PLEASE ADVISE PT SHE CAN BE REACHED ANYTIME AND OK TO LVM.

## 2022-12-29 ENCOUNTER — OFFICE VISIT (OUTPATIENT)
Dept: OBSTETRICS AND GYNECOLOGY | Facility: CLINIC | Age: 32
End: 2022-12-29

## 2022-12-29 VITALS
WEIGHT: 221 LBS | DIASTOLIC BLOOD PRESSURE: 78 MMHG | SYSTOLIC BLOOD PRESSURE: 118 MMHG | BODY MASS INDEX: 43.39 KG/M2 | HEIGHT: 60 IN

## 2022-12-29 DIAGNOSIS — Z01.419 WOMEN'S ANNUAL ROUTINE GYNECOLOGICAL EXAMINATION: ICD-10-CM

## 2022-12-29 DIAGNOSIS — N89.8 VAGINAL ITCHING: ICD-10-CM

## 2022-12-29 DIAGNOSIS — Z97.5 NEXPLANON IN PLACE: Primary | ICD-10-CM

## 2022-12-29 PROCEDURE — 99395 PREV VISIT EST AGE 18-39: CPT | Performed by: OBSTETRICS & GYNECOLOGY

## 2022-12-29 PROCEDURE — 3008F BODY MASS INDEX DOCD: CPT | Performed by: OBSTETRICS & GYNECOLOGY

## 2022-12-29 PROCEDURE — 2014F MENTAL STATUS ASSESS: CPT | Performed by: OBSTETRICS & GYNECOLOGY

## 2022-12-29 RX ORDER — FLUTICASONE PROPIONATE 0.05 %
CREAM (GRAM) TOPICAL
COMMUNITY
Start: 2022-12-28

## 2022-12-29 RX ORDER — HYDROXYZINE HYDROCHLORIDE 10 MG/1
10-20 TABLET, FILM COATED ORAL
COMMUNITY
Start: 2022-12-28
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
## 2022-12-31 LAB
A VAGINAE DNA VAG QL NAA+PROBE: NORMAL SCORE
BVAB2 DNA VAG QL NAA+PROBE: NORMAL SCORE
C ALBICANS DNA VAG QL NAA+PROBE: NEGATIVE
C GLABRATA DNA VAG QL NAA+PROBE: NEGATIVE
C TRACH DNA VAG QL NAA+PROBE: NEGATIVE
MEGA1 DNA VAG QL NAA+PROBE: NORMAL SCORE
N GONORRHOEA DNA VAG QL NAA+PROBE: NEGATIVE
T VAGINALIS DNA VAG QL NAA+PROBE: NEGATIVE

## 2023-01-04 LAB — REF LAB TEST METHOD: NORMAL

## 2023-04-11 ENCOUNTER — TELEPHONE (OUTPATIENT)
Dept: OBSTETRICS AND GYNECOLOGY | Facility: CLINIC | Age: 33
End: 2023-04-11
Payer: MEDICAID

## 2023-04-11 DIAGNOSIS — B37.31 VAGINAL YEAST INFECTION: Primary | ICD-10-CM

## 2023-04-11 RX ORDER — FLUCONAZOLE 150 MG/1
150 TABLET ORAL AS NEEDED
Qty: 3 TABLET | Refills: 0 | Status: SHIPPED | OUTPATIENT
Start: 2023-04-11

## 2023-04-11 NOTE — TELEPHONE ENCOUNTER
Patient w/ c/o vaginal itching. She reports recently completing an antibiotic for a sinus infection. She denies vaginal discharge, concern for STD. She states she cannot smell since covid. Patient has previously tolerated Diflucan. Advised to call for appt if symptoms do not improve. Pt QUINCY.

## 2023-04-11 NOTE — TELEPHONE ENCOUNTER
patient feels she has a yeast infection after completion of antibiotics is wondering if we could call in diflucan

## 2024-10-08 ENCOUNTER — OFFICE VISIT (OUTPATIENT)
Dept: OBSTETRICS AND GYNECOLOGY | Facility: CLINIC | Age: 34
End: 2024-10-08
Payer: COMMERCIAL

## 2024-10-08 VITALS — BODY MASS INDEX: 37.5 KG/M2 | WEIGHT: 192 LBS | SYSTOLIC BLOOD PRESSURE: 118 MMHG | DIASTOLIC BLOOD PRESSURE: 84 MMHG

## 2024-10-08 DIAGNOSIS — Z30.46 ENCOUNTER FOR SURVEILLANCE OF IMPLANTABLE SUBDERMAL CONTRACEPTIVE: ICD-10-CM

## 2024-10-08 DIAGNOSIS — Z01.419 WOMEN'S ANNUAL ROUTINE GYNECOLOGICAL EXAMINATION: Primary | ICD-10-CM

## 2024-10-08 DIAGNOSIS — Z97.5 BREAKTHROUGH BLEEDING ON NEXPLANON: ICD-10-CM

## 2024-10-08 DIAGNOSIS — N92.1 BREAKTHROUGH BLEEDING ON NEXPLANON: ICD-10-CM

## 2024-10-08 RX ORDER — SEMAGLUTIDE 1 MG/.5ML
INJECTION, SOLUTION SUBCUTANEOUS
COMMUNITY
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
## 2024-11-04 ENCOUNTER — TELEPHONE (OUTPATIENT)
Dept: OBSTETRICS AND GYNECOLOGY | Facility: CLINIC | Age: 34
End: 2024-11-04
Payer: COMMERCIAL

## 2024-11-04 NOTE — TELEPHONE ENCOUNTER
Attempted to return patient's call. Left voice message informing her that a pap specimen was not sent to the lab. Her last pap was 12/29/2022 and was normal; next pap due December 2025. Can call back if she still has questions.

## 2024-11-04 NOTE — TELEPHONE ENCOUNTER
PT STATES SHE WAS IN ON 10/08 AND HAD A PAP DONE. SHE HAS NOT GOTTEN THE RESULTS BACK SO SHE WAS CALLING TO CHECK ON THIS    PLEASE ADVISE

## (undated) DEVICE — TRY SPINE BLCK WHITACRE 25G 3X5IN

## (undated) DEVICE — APPL CHLORAPREP W/TINT 26ML BLU

## (undated) DEVICE — PK C/SECT 10

## (undated) DEVICE — NDL HYPO ECLPS SFTY 18G 1 1/2IN

## (undated) DEVICE — VIOLET BRAIDED (POLYGLACTIN 910), SYNTHETIC ABSORBABLE SUTURE: Brand: COATED VICRYL

## (undated) DEVICE — ADHS LIQ MASTISOL 2/3ML

## (undated) DEVICE — GLV SURG BIOGEL LTX PF 6 1/2

## (undated) DEVICE — SUT VIC 2/0 CT1 27IN J259H

## (undated) DEVICE — ANTIBACTERIAL UNDYED BRAIDED (POLYGLACTIN 910), SYNTHETIC ABSORBABLE SUTURE: Brand: COATED VICRYL

## (undated) DEVICE — SOL IRR NACL 0.9PCT BT 1000ML

## (undated) DEVICE — SOL IRR H2O BTL 1000ML STRL

## (undated) DEVICE — 3M™ STERI-STRIP™ REINFORCED ADHESIVE SKIN CLOSURES, R1547, 1/2 IN X 4 IN (12 MM X 100 MM), 6 STRIPS/ENVELOPE: Brand: 3M™ STERI-STRIP™

## (undated) DEVICE — SKIN AFFIX SURG ADHESIVE 72/CS 0.55ML: Brand: MEDLINE

## (undated) DEVICE — SYR LUERLOK 10CC

## (undated) DEVICE — MAT PREVALON MOBL TRANSFR AIR WO/PAD 39X80IN

## (undated) DEVICE — SUT VIC 2/0 CT1 27IN J339H BX/36